# Patient Record
Sex: MALE | Race: WHITE | Employment: UNEMPLOYED | URBAN - METROPOLITAN AREA
[De-identification: names, ages, dates, MRNs, and addresses within clinical notes are randomized per-mention and may not be internally consistent; named-entity substitution may affect disease eponyms.]

---

## 2017-09-14 ENCOUNTER — OFFICE VISIT (OUTPATIENT)
Dept: FAMILY MEDICINE CLINIC | Age: 32
End: 2017-09-14
Payer: MEDICAID

## 2017-09-14 VITALS
OXYGEN SATURATION: 97 % | HEIGHT: 70 IN | HEART RATE: 65 BPM | BODY MASS INDEX: 26.2 KG/M2 | WEIGHT: 183 LBS | SYSTOLIC BLOOD PRESSURE: 128 MMHG | DIASTOLIC BLOOD PRESSURE: 82 MMHG | TEMPERATURE: 97.6 F

## 2017-09-14 DIAGNOSIS — I10 ESSENTIAL HYPERTENSION: ICD-10-CM

## 2017-09-14 DIAGNOSIS — F41.9 ANXIETY: ICD-10-CM

## 2017-09-14 DIAGNOSIS — Z00.00 WELL ADULT EXAM: Primary | ICD-10-CM

## 2017-09-14 DIAGNOSIS — Z23 FLU VACCINE NEED: ICD-10-CM

## 2017-09-14 DIAGNOSIS — M51.36 BULGING LUMBAR DISC: ICD-10-CM

## 2017-09-14 PROCEDURE — 99385 PREV VISIT NEW AGE 18-39: CPT | Performed by: PHYSICIAN ASSISTANT

## 2017-09-14 PROCEDURE — 90472 IMMUNIZATION ADMIN EACH ADD: CPT | Performed by: PHYSICIAN ASSISTANT

## 2017-09-14 PROCEDURE — 90471 IMMUNIZATION ADMIN: CPT | Performed by: PHYSICIAN ASSISTANT

## 2017-09-14 PROCEDURE — 90688 IIV4 VACCINE SPLT 0.5 ML IM: CPT | Performed by: PHYSICIAN ASSISTANT

## 2017-09-14 RX ORDER — ECHINACEA 400 MG
CAPSULE ORAL
COMMUNITY
End: 2018-02-15 | Stop reason: ALTCHOICE

## 2017-09-14 RX ORDER — SERTRALINE HYDROCHLORIDE 100 MG/1
100 TABLET, FILM COATED ORAL DAILY
COMMUNITY
End: 2017-09-14 | Stop reason: SDUPTHER

## 2017-09-14 RX ORDER — SERTRALINE HYDROCHLORIDE 100 MG/1
150 TABLET, FILM COATED ORAL DAILY
Qty: 145 TABLET | Refills: 3 | Status: SHIPPED | OUTPATIENT
Start: 2017-09-14 | End: 2018-02-15 | Stop reason: SDUPTHER

## 2017-09-14 RX ORDER — MELOXICAM 15 MG/1
15 TABLET ORAL DAILY
COMMUNITY
End: 2017-09-14 | Stop reason: SDUPTHER

## 2017-09-14 RX ORDER — LISINOPRIL 10 MG/1
10 TABLET ORAL DAILY
Qty: 90 TABLET | Refills: 3 | Status: SHIPPED | OUTPATIENT
Start: 2017-09-14 | End: 2018-08-03 | Stop reason: SDUPTHER

## 2017-09-14 RX ORDER — LISINOPRIL 10 MG/1
10 TABLET ORAL DAILY
COMMUNITY
End: 2017-09-14 | Stop reason: SDUPTHER

## 2017-09-14 RX ORDER — MELOXICAM 15 MG/1
15 TABLET ORAL DAILY
Qty: 90 TABLET | Refills: 3 | Status: SHIPPED | OUTPATIENT
Start: 2017-09-14 | End: 2018-08-03 | Stop reason: SDUPTHER

## 2017-09-14 ASSESSMENT — ENCOUNTER SYMPTOMS
TROUBLE SWALLOWING: 0
SHORTNESS OF BREATH: 0
CHEST TIGHTNESS: 0
EYE DISCHARGE: 0
COLOR CHANGE: 0
WHEEZING: 0
EYE REDNESS: 0
BLOOD IN STOOL: 0
DIARRHEA: 0
COUGH: 0
CONSTIPATION: 0
VOMITING: 0
ABDOMINAL PAIN: 0
SORE THROAT: 0
NAUSEA: 0
VOICE CHANGE: 0

## 2017-09-19 ENCOUNTER — HOSPITAL ENCOUNTER (OUTPATIENT)
Age: 32
Setting detail: SPECIMEN
Discharge: HOME | End: 2017-09-19
Payer: MEDICAID

## 2017-09-19 DIAGNOSIS — Z00.00 WELL ADULT EXAM: ICD-10-CM

## 2017-09-19 LAB
ABSOLUTE EOS #: 0.1 K/UL (ref 0–0.4)
ABSOLUTE LYMPH #: 2.3 K/UL (ref 1–4.8)
ABSOLUTE MONO #: 0.7 K/UL (ref 0.1–1.2)
ALBUMIN SERPL-MCNC: 4.3 G/DL (ref 3.5–5.2)
ALBUMIN/GLOBULIN RATIO: 1.6 (ref 1–2.5)
ALP BLD-CCNC: 53 U/L (ref 40–129)
ALT SERPL-CCNC: 21 U/L (ref 5–41)
ANION GAP SERPL CALCULATED.3IONS-SCNC: 15 MMOL/L (ref 9–17)
AST SERPL-CCNC: 24 U/L
BASOPHILS # BLD: 1 %
BASOPHILS ABSOLUTE: 0 K/UL (ref 0–0.2)
BILIRUB SERPL-MCNC: 0.67 MG/DL (ref 0.3–1.2)
BUN BLDV-MCNC: 16 MG/DL (ref 6–20)
BUN/CREAT BLD: NORMAL (ref 9–20)
CALCIUM SERPL-MCNC: 9.5 MG/DL (ref 8.6–10.4)
CHLORIDE BLD-SCNC: 102 MMOL/L (ref 98–107)
CHOLESTEROL/HDL RATIO: 3.9
CHOLESTEROL: 177 MG/DL
CO2: 23 MMOL/L (ref 20–31)
CREAT SERPL-MCNC: 0.98 MG/DL (ref 0.7–1.2)
DIFFERENTIAL TYPE: NORMAL
EOSINOPHILS RELATIVE PERCENT: 1 %
GFR AFRICAN AMERICAN: >60 ML/MIN
GFR NON-AFRICAN AMERICAN: >60 ML/MIN
GFR SERPL CREATININE-BSD FRML MDRD: NORMAL ML/MIN/{1.73_M2}
GFR SERPL CREATININE-BSD FRML MDRD: NORMAL ML/MIN/{1.73_M2}
GLUCOSE BLD-MCNC: 92 MG/DL (ref 70–99)
HCT VFR BLD CALC: 43.5 % (ref 41–53)
HDLC SERPL-MCNC: 45 MG/DL
HEMOGLOBIN: 14.9 G/DL (ref 13.5–17.5)
LDL CHOLESTEROL: 114 MG/DL (ref 0–130)
LYMPHOCYTES # BLD: 37 %
MCH RBC QN AUTO: 30.6 PG (ref 26–34)
MCHC RBC AUTO-ENTMCNC: 34.2 G/DL (ref 31–37)
MCV RBC AUTO: 89.5 FL (ref 80–100)
MONOCYTES # BLD: 11 %
PDW BLD-RTO: 12.8 % (ref 12.5–15.4)
PLATELET # BLD: 202 K/UL (ref 140–450)
PLATELET ESTIMATE: NORMAL
PMV BLD AUTO: 8.5 FL (ref 6–12)
POTASSIUM SERPL-SCNC: 4.8 MMOL/L (ref 3.7–5.3)
RBC # BLD: 4.87 M/UL (ref 4.5–5.9)
RBC # BLD: NORMAL 10*6/UL
SEG NEUTROPHILS: 50 %
SEGMENTED NEUTROPHILS ABSOLUTE COUNT: 3.2 K/UL (ref 1.8–7.7)
SODIUM BLD-SCNC: 140 MMOL/L (ref 135–144)
TOTAL PROTEIN: 7 G/DL (ref 6.4–8.3)
TRIGL SERPL-MCNC: 88 MG/DL
TSH SERPL DL<=0.05 MIU/L-ACNC: 1.84 MIU/L (ref 0.3–5)
VLDLC SERPL CALC-MCNC: NORMAL MG/DL (ref 1–30)
WBC # BLD: 6.2 K/UL (ref 3.5–11)
WBC # BLD: NORMAL 10*3/UL

## 2017-10-16 ENCOUNTER — OFFICE VISIT (OUTPATIENT)
Dept: FAMILY MEDICINE CLINIC | Age: 32
End: 2017-10-16
Payer: MEDICAID

## 2017-10-16 VITALS
HEART RATE: 74 BPM | DIASTOLIC BLOOD PRESSURE: 70 MMHG | OXYGEN SATURATION: 97 % | SYSTOLIC BLOOD PRESSURE: 124 MMHG | WEIGHT: 186 LBS | TEMPERATURE: 98.4 F | HEIGHT: 70 IN | BODY MASS INDEX: 26.63 KG/M2

## 2017-10-16 DIAGNOSIS — F41.9 ANXIETY: Primary | ICD-10-CM

## 2017-10-16 PROCEDURE — 99213 OFFICE O/P EST LOW 20 MIN: CPT | Performed by: PHYSICIAN ASSISTANT

## 2017-10-16 ASSESSMENT — ENCOUNTER SYMPTOMS
COLOR CHANGE: 0
WHEEZING: 0
SHORTNESS OF BREATH: 0
COUGH: 0

## 2017-10-16 NOTE — PROGRESS NOTES
[address] [address]  Dept: [phone]  Dept Fax: [phone]    Ally Navarro is a 27 y.o. male who presents today for his medical conditions/complaints as noted below. Ally Navarro is c/o of   Chief Complaint   Patient presents with    Anxiety     follow up on zoloft/ 1 month follow up. HPI:     HPI    Patient here for follow up on the zoloft use. He states the current dose is going well  He states he is feeling well on the present dose. 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 He states he has paperwork that needs reviewed for evaluation regarding his hx of back troubles that previously were worked up by the [place].        No results found for: LABA1C          ( goal A1C is < 7)   No results found for: LABMICR  LDL Cholesterol (mg/dL)   Date Value   09/19/2017 114       (goal LDL is <100)   AST (U/L)   Date Value   09/19/2017 24     ALT (U/L)   Date Value   09/19/2017 21     BUN (mg/dL)   Date Value   09/19/2017 16     BP Readings from Last 3 Encounters:   10/16/17 124/70   09/14/17 128/82          (goal 120/80)    Past Medical History:   Diagnosis Date    Anxiety     Bulging lumbar disc     Chronic dryness of both eyes     Hypertension       Past Surgical History:   Procedure Laterality Date    EYE SURGERY      SEPTOPLASTY      TONSILLECTOMY         Family History   Problem Relation Age of Onset    Diabetes Maternal Grandfather     Other Mother      cholelithiasis    Other Maternal Grandmother      alzeimers    Cancer Paternal Grandmother      unknown       Social History   Substance Use Topics    Smoking status: Never Smoker    Smokeless tobacco: Never Used    Alcohol use No      Current Outpatient Prescriptions   Medication Sig Dispense Refill    Multiple Vitamins-Minerals (MENS MULTI VITAMIN & MINERAL PO) Take by mouth      Flaxseed, Linseed, (FLAXSEED OIL) 1000 MG CAPS Take by mouth      meloxicam (MOBIC) 15 MG tablet Take 1 tablet by mouth daily 90 tablet 3    lisinopril (PRINIVIL;ZESTRIL) 10 MG tablet Take 1 tablet by mouth daily 90 tablet 3    sertraline (ZOLOFT) 100 MG tablet Take 1.5 tablets by mouth daily 145 tablet 3     No current facility-administered medications for this visit. No Known Allergies    Health Maintenance   Topic Date Due    HIV screen  05/15/1998    DTaP/Tdap/Td vaccine (1 - Tdap) 05/15/2002    Flu vaccine  Completed       Subjective:      Review of Systems   Constitutional: Negative for activity change, appetite change, fatigue, fever and unexpected weight change. /70 (Site: Left Arm, Position: Sitting, Cuff Size: Medium Adult)   Pulse 74   Temp 98.4 °F (36.9 °C) (Tympanic)   Ht 5' 10\" (1.778 m)   Wt 186 lb (84.4 kg)   SpO2 97%   BMI 26.69 kg/m²    Respiratory: Negative for cough, shortness of breath and wheezing. Cardiovascular: Negative for chest pain and palpitations. Skin: Negative for color change, pallor, rash and wound. Neurological: Negative for weakness. Hematological: Negative for adenopathy. Psychiatric/Behavioral: Negative for agitation, confusion, sleep disturbance and suicidal ideas. The patient is not nervous/anxious. Objective:     Physical Exam   Constitutional: He is oriented to person, place, and time. He appears well-developed and well-nourished. HENT:   Head: Normocephalic and atraumatic. Cardiovascular: Normal rate, regular rhythm and normal heart sounds. No murmur heard. Pulmonary/Chest: Effort normal and breath sounds normal. No respiratory distress. He has no wheezes. He has no rales. Neurological: He is alert and oriented to person, place, and time. Skin: Skin is warm and dry. No rash noted. No erythema. No pallor. Psychiatric: He has a normal mood and affect. His behavior is normal. Judgment and thought content normal.   Nursing note and vitals reviewed.     BP 124/70 (Site: Left Arm, Position: Sitting, Cuff Size: Medium Adult)   Pulse 74   Temp 98.4 °F (36.9 °C) (Tympanic)   Ht 5' 10\" (1.778 m)   Wt 186 lb (84.4 kg)   SpO2 97%   BMI 26.69 kg/m²     Assessment:      1. Anxiety               Plan:      Return in about 4 months (around 2/16/2018) for med review. Patient doing well on present dose of zoloft  Cont present treatment plan  Last labs reviewed and stable  Patient will try and get records for my review, awaiting prior [facility] work up  Patient agreed with Jojo Alcant[facility] [address]. Maintenance reviewed. Patient given educational materials - see patient instructions. Discussed use, benefit, and side effects of prescribed medications. All patient questions answered. Pt voiced understanding. Reviewed health maintenance. Instructed to continue current medications, diet and exercise. Patient agreed with treatment plan. Follow up as directed.      Electronically signed by Sal Pimentel PA-C on 10/16/2017 at 12:49 PM

## 2018-02-15 ENCOUNTER — OFFICE VISIT (OUTPATIENT)
Dept: FAMILY MEDICINE CLINIC | Age: 33
End: 2018-02-15
Payer: COMMERCIAL

## 2018-02-15 VITALS
HEART RATE: 52 BPM | DIASTOLIC BLOOD PRESSURE: 74 MMHG | OXYGEN SATURATION: 97 % | BODY MASS INDEX: 25.91 KG/M2 | TEMPERATURE: 98.2 F | HEIGHT: 70 IN | SYSTOLIC BLOOD PRESSURE: 132 MMHG | WEIGHT: 181 LBS

## 2018-02-15 DIAGNOSIS — F41.9 ANXIETY: ICD-10-CM

## 2018-02-15 DIAGNOSIS — I10 ESSENTIAL HYPERTENSION: Primary | ICD-10-CM

## 2018-02-15 DIAGNOSIS — M51.36 BULGING LUMBAR DISC: ICD-10-CM

## 2018-02-15 PROCEDURE — 99213 OFFICE O/P EST LOW 20 MIN: CPT | Performed by: PHYSICIAN ASSISTANT

## 2018-02-15 RX ORDER — SERTRALINE HYDROCHLORIDE 100 MG/1
150 TABLET, FILM COATED ORAL DAILY
Qty: 145 TABLET | Refills: 3 | Status: SHIPPED | OUTPATIENT
Start: 2018-02-15 | End: 2018-08-03 | Stop reason: SDUPTHER

## 2018-02-15 RX ORDER — CHLORAL HYDRATE 500 MG
3000 CAPSULE ORAL 3 TIMES DAILY
COMMUNITY

## 2018-02-15 ASSESSMENT — PATIENT HEALTH QUESTIONNAIRE - PHQ9
SUM OF ALL RESPONSES TO PHQ QUESTIONS 1-9: 0
1. LITTLE INTEREST OR PLEASURE IN DOING THINGS: 0
SUM OF ALL RESPONSES TO PHQ9 QUESTIONS 1 & 2: 0
2. FEELING DOWN, DEPRESSED OR HOPELESS: 0

## 2018-02-15 ASSESSMENT — ENCOUNTER SYMPTOMS
COUGH: 0
NAUSEA: 0
VOMITING: 0
CONSTIPATION: 0
COLOR CHANGE: 0
SHORTNESS OF BREATH: 0
BLURRED VISION: 0
DIARRHEA: 0
WHEEZING: 0
ABDOMINAL PAIN: 0

## 2018-02-15 NOTE — PROGRESS NOTES
Surgical History:   Procedure Laterality Date    EYE SURGERY      SEPTOPLASTY      TONSILLECTOMY         Family History   Problem Relation Age of Onset    Diabetes Maternal Grandfather     Other Mother      cholelithiasis    Other Maternal Grandmother      alzeimers    Cancer Paternal Grandmother      unknown       Social History   Substance Use Topics    Smoking status: Never Smoker    Smokeless tobacco: Never Used    Alcohol use No      Current Outpatient Prescriptions   Medication Sig Dispense Refill    Omega-3 Fatty Acids (FISH OIL) 1000 MG CAPS Take 3,000 mg by mouth 3 times daily      sertraline (ZOLOFT) 100 MG tablet Take 1.5 tablets by mouth daily 145 tablet 3    Multiple Vitamins-Minerals (MENS MULTI VITAMIN & MINERAL PO) Take by mouth      meloxicam (MOBIC) 15 MG tablet Take 1 tablet by mouth daily 90 tablet 3    lisinopril (PRINIVIL;ZESTRIL) 10 MG tablet Take 1 tablet by mouth daily 90 tablet 3     No current facility-administered medications for this visit. No Known Allergies    Health Maintenance   Topic Date Due    HIV screen  05/15/1998    DTaP/Tdap/Td vaccine (1 - Tdap) 05/15/2002    Potassium monitoring  09/19/2018    Creatinine monitoring  09/19/2018    Flu vaccine  Completed       Subjective:      Review of Systems   Constitutional: Negative for activity change, appetite change, fatigue, fever and unexpected weight change. /74   Pulse 52   Temp 98.2 °F (36.8 °C) (Oral)   Ht 5' 10\" (1.778 m)   Wt 181 lb (82.1 kg)   SpO2 97%   BMI 25.97 kg/m²    Eyes: Negative for blurred vision. Respiratory: Negative for cough, shortness of breath and wheezing. Cardiovascular: Negative for chest pain and palpitations. Gastrointestinal: Negative for abdominal pain, constipation, diarrhea, nausea and vomiting. Skin: Negative for color change, pallor, rash and wound. Neurological: Negative for weakness and headaches. Hematological: Negative for adenopathy. diet and exercise. Patient agreed with treatment plan. Follow up as directed.      Electronically signed by Aashish Doe PA-C on 2/15/2018 at 8:24 AM

## 2018-08-03 ENCOUNTER — OFFICE VISIT (OUTPATIENT)
Dept: FAMILY MEDICINE CLINIC | Age: 33
End: 2018-08-03
Payer: COMMERCIAL

## 2018-08-03 VITALS
HEIGHT: 70 IN | RESPIRATION RATE: 18 BRPM | DIASTOLIC BLOOD PRESSURE: 82 MMHG | TEMPERATURE: 97.3 F | OXYGEN SATURATION: 96 % | WEIGHT: 200 LBS | SYSTOLIC BLOOD PRESSURE: 130 MMHG | BODY MASS INDEX: 28.63 KG/M2 | HEART RATE: 80 BPM

## 2018-08-03 DIAGNOSIS — F41.9 ANXIETY: ICD-10-CM

## 2018-08-03 DIAGNOSIS — I10 ESSENTIAL HYPERTENSION: ICD-10-CM

## 2018-08-03 DIAGNOSIS — M51.36 BULGING LUMBAR DISC: ICD-10-CM

## 2018-08-03 PROCEDURE — 99213 OFFICE O/P EST LOW 20 MIN: CPT | Performed by: INTERNAL MEDICINE

## 2018-08-03 RX ORDER — SERTRALINE HYDROCHLORIDE 100 MG/1
150 TABLET, FILM COATED ORAL DAILY
Qty: 145 TABLET | Refills: 3 | Status: SHIPPED | OUTPATIENT
Start: 2018-08-03 | End: 2019-02-08 | Stop reason: SDUPTHER

## 2018-08-03 RX ORDER — MELOXICAM 15 MG/1
15 TABLET ORAL DAILY
Qty: 90 TABLET | Refills: 3 | Status: SHIPPED | OUTPATIENT
Start: 2018-08-03 | End: 2019-02-08 | Stop reason: SDUPTHER

## 2018-08-03 RX ORDER — LISINOPRIL 10 MG/1
10 TABLET ORAL DAILY
Qty: 90 TABLET | Refills: 3 | Status: SHIPPED | OUTPATIENT
Start: 2018-08-03 | End: 2019-02-08 | Stop reason: SDUPTHER

## 2018-08-03 ASSESSMENT — ENCOUNTER SYMPTOMS
DIARRHEA: 0
CONSTIPATION: 0
BLOOD IN STOOL: 0
SHORTNESS OF BREATH: 0
COUGH: 0
ORTHOPNEA: 0
BLURRED VISION: 0
WHEEZING: 0
CHOKING: 0
CHEST TIGHTNESS: 0
ABDOMINAL PAIN: 0
BACK PAIN: 0

## 2018-08-03 NOTE — PROGRESS NOTES
[address] [facility] [address]  Dept: [phone]  Dept Fax: [phone]    Gurvinder Sharma is a 26 y.o. male who presents today for his medical conditions/complaints as noted below. Gurvinder Sharma is c/o of   Chief Complaint   Patient presents with    Hypertension     Med check         HPI:     Here for routine med check   Was supposed to see Mark Wu   Is on zoloft for anxiety and lisinopril for hypertension   States he is still doing very well on both these medications   Is very physically active  No cardiac sxs   No side effects from medication   Checks bp about once a week and is typically 120/80  Had labs 9/2017      Hypertension   This is a chronic problem. The current episode started more than 1 year ago. The problem is unchanged. The problem is controlled. Associated symptoms include anxiety. Pertinent negatives include no blurred vision, chest pain, headaches, malaise/fatigue, neck pain, orthopnea, palpitations, peripheral edema, PND, shortness of breath or sweats. There are no associated agents to hypertension. Risk factors for coronary artery disease include family history, male gender and stress. Past treatments include ACE inhibitors. The current treatment provides moderate improvement. There are no compliance problems. There is no history of angina, kidney disease, CAD/MI, CVA, heart failure or left ventricular hypertrophy. Identifiable causes of hypertension include a hypertension causing med. There is no history of chronic renal disease, coarctation of the aorta, hyperaldosteronism, pheochromocytoma, renovascular disease or a thyroid problem.        No results found for: LABA1C          ( goal A1C is < 7)   No results found for: LABMICR  LDL Cholesterol (mg/dL)   Date Value   09/19/2017 114       (goal LDL is <100)   AST (U/L)   Date Value   09/19/2017 24     ALT (U/L)   Date Value   09/19/2017 21     BUN (mg/dL) Date Value   09/19/2017 16     BP Readings from Last 3 Encounters:   08/03/18 130/82   02/15/18 132/74   10/16/17 124/70          (goal 120/80)    Past Medical History:   Diagnosis Date    Anxiety     Bulging lumbar disc     Chronic dryness of both eyes     Hypertension       Past Surgical History:   Procedure Laterality Date    EYE SURGERY      SEPTOPLASTY      TONSILLECTOMY         Family History   Problem Relation Age of Onset    Diabetes Maternal Grandfather     Other Mother         cholelithiasis    Other Maternal Grandmother         alzeimers    Cancer Paternal Grandmother         unknown       Social History   Substance Use Topics    Smoking status: Never Smoker    Smokeless tobacco: Never Used    Alcohol use No      Current Outpatient Prescriptions   Medication Sig Dispense Refill    lisinopril (PRINIVIL;ZESTRIL) 10 MG tablet Take 1 tablet by mouth daily 90 tablet 3    meloxicam (MOBIC) 15 MG tablet Take 1 tablet by mouth daily 90 tablet 3    sertraline (ZOLOFT) 100 MG tablet Take 1.5 tablets by mouth daily 145 tablet 3    Omega-3 Fatty Acids (FISH OIL) 1000 MG CAPS Take 3,000 mg by mouth 3 times daily      Multiple Vitamins-Minerals (MENS MULTI VITAMIN & MINERAL PO) Take by mouth       No current facility-administered medications for this visit. No Known Allergies    Health Maintenance   Topic Date Due    HIV screen  05/15/1998    DTaP/Tdap/Td vaccine (1 - Tdap) 05/15/2002    Flu vaccine (1) 09/01/2018    Potassium monitoring  09/19/2018    Creatinine monitoring  09/19/2018       Subjective:      Review of Systems   Constitutional: Negative for activity change, appetite change, chills, diaphoresis, fatigue, fever, malaise/fatigue and unexpected weight change. Eyes: Negative for blurred vision and visual disturbance. Respiratory: Negative for cough, choking, chest tightness, shortness of breath and wheezing.     Cardiovascular: Negative for chest pain, palpitations, orthopnea, leg swelling and PND. Gastrointestinal: Negative for abdominal pain, blood in stool, constipation and diarrhea. Genitourinary: Negative for difficulty urinating. Musculoskeletal: Negative for arthralgias, back pain and neck pain. Skin: Negative for rash. Neurological: Negative for dizziness, syncope, weakness, light-headedness and headaches. Hematological: Negative for adenopathy. Does not bruise/bleed easily. Psychiatric/Behavioral: Negative for decreased concentration and sleep disturbance. The patient is nervous/anxious. Objective:     Physical Exam   Constitutional: He is oriented to person, place, and time. He appears well-developed and well-nourished. No distress. HENT:   Right Ear: External ear normal.   Left Ear: External ear normal.   Nose: Nose normal.   Eyes: EOM are normal. Pupils are equal, round, and reactive to light. Neck: Normal range of motion. Neck supple. No JVD present. No thyromegaly present. Cardiovascular: Normal rate, regular rhythm, S1 normal, S2 normal and normal heart sounds. No extrasystoles are present. Exam reveals no gallop. No murmur heard. Pulmonary/Chest: Effort normal and breath sounds normal. No respiratory distress. He has no wheezes. Abdominal: Soft. Bowel sounds are normal. He exhibits no distension. There is no splenomegaly or hepatomegaly. There is no tenderness. Lymphadenopathy:     He has no cervical adenopathy. Neurological: He is alert and oriented to person, place, and time. Skin: Skin is warm and dry. No rash noted. He is not diaphoretic. Psychiatric: He has a normal mood and affect. Nursing note and vitals reviewed. /82 (Site: Left Arm, Position: Sitting, Cuff Size: Large Adult)   Pulse 80   Temp 97.3 °F (36.3 °C) (Tympanic)   Resp 18   Ht 5' 10\" (1.778 m)   Wt 200 lb (90.7 kg)   SpO2 96%   BMI 28.70 kg/m²     Assessment:       Diagnosis Orders   1.  Essential hypertension  lisinopril

## 2018-08-03 NOTE — PROGRESS NOTES
Visit Information    Have you changed or started any medications since your last visit including any over-the-counter medicines, vitamins, or herbal medicines? no   Are you having any side effects from any of your medications? -  no  Have you stopped taking any of your medications? Is so, why? -  no    Have you seen any other physician or provider since your last visit? No  Have you had any other diagnostic tests since your last visit? No  Have you been seen in the emergency room and/or had an admission to a hospital since we last saw you? No  Have you had your routine dental cleaning in the past 6 months? no    Have you activated your [employer] account? If not, what are your barriers?  Yes     Patient Care Team:  Marvel Kawasaki, PA-C as PCP - General (Family Medicine)    Medical History Review  Past Medical, Family, and Social History reviewed and does not contribute to the patient presenting condition    Health Maintenance   Topic Date Due    HIV screen  05/15/1998    DTaP/Tdap/Td vaccine (1 - Tdap) 05/15/2002    Flu vaccine (1) 09/01/2018    Potassium monitoring  09/19/2018    Creatinine monitoring  09/19/2018

## 2019-02-08 ENCOUNTER — HOSPITAL ENCOUNTER (OUTPATIENT)
Age: 34
Setting detail: SPECIMEN
Discharge: HOME | End: 2019-02-08
Payer: COMMERCIAL

## 2019-02-08 ENCOUNTER — OFFICE VISIT (OUTPATIENT)
Dept: FAMILY MEDICINE CLINIC | Age: 34
End: 2019-02-08
Payer: COMMERCIAL

## 2019-02-08 VITALS
WEIGHT: 199 LBS | DIASTOLIC BLOOD PRESSURE: 80 MMHG | SYSTOLIC BLOOD PRESSURE: 130 MMHG | HEART RATE: 81 BPM | HEIGHT: 70 IN | OXYGEN SATURATION: 99 % | BODY MASS INDEX: 28.49 KG/M2

## 2019-02-08 DIAGNOSIS — M51.36 BULGING LUMBAR DISC: ICD-10-CM

## 2019-02-08 DIAGNOSIS — Z13.220 ENCOUNTER FOR SCREENING FOR LIPID DISORDER: ICD-10-CM

## 2019-02-08 DIAGNOSIS — I10 ESSENTIAL HYPERTENSION: ICD-10-CM

## 2019-02-08 DIAGNOSIS — F41.9 ANXIETY: ICD-10-CM

## 2019-02-08 DIAGNOSIS — I10 ESSENTIAL HYPERTENSION: Primary | ICD-10-CM

## 2019-02-08 LAB
ABSOLUTE EOS #: 0.15 K/UL (ref 0–0.44)
ABSOLUTE IMMATURE GRANULOCYTE: <0.03 K/UL (ref 0–0.3)
ABSOLUTE LYMPH #: 2.28 K/UL (ref 1.1–3.7)
ABSOLUTE MONO #: 0.52 K/UL (ref 0.1–1.2)
ALBUMIN SERPL-MCNC: 4.7 G/DL (ref 3.5–5.2)
ALBUMIN/GLOBULIN RATIO: 1.7 (ref 1–2.5)
ALP BLD-CCNC: 62 U/L (ref 40–129)
ALT SERPL-CCNC: 24 U/L (ref 5–41)
ANION GAP SERPL CALCULATED.3IONS-SCNC: 10 MMOL/L (ref 9–17)
AST SERPL-CCNC: 25 U/L
BASOPHILS # BLD: 1 % (ref 0–2)
BASOPHILS ABSOLUTE: 0.04 K/UL (ref 0–0.2)
BILIRUB SERPL-MCNC: 0.7 MG/DL (ref 0.3–1.2)
BUN BLDV-MCNC: 22 MG/DL (ref 6–20)
BUN/CREAT BLD: ABNORMAL (ref 9–20)
CALCIUM SERPL-MCNC: 9.9 MG/DL (ref 8.6–10.4)
CHLORIDE BLD-SCNC: 104 MMOL/L (ref 98–107)
CHOLESTEROL/HDL RATIO: 5.7
CHOLESTEROL: 216 MG/DL
CO2: 25 MMOL/L (ref 20–31)
CREAT SERPL-MCNC: 1.13 MG/DL (ref 0.7–1.2)
DIFFERENTIAL TYPE: NORMAL
EOSINOPHILS RELATIVE PERCENT: 3 % (ref 1–4)
GFR AFRICAN AMERICAN: >60 ML/MIN
GFR NON-AFRICAN AMERICAN: >60 ML/MIN
GFR SERPL CREATININE-BSD FRML MDRD: ABNORMAL ML/MIN/{1.73_M2}
GFR SERPL CREATININE-BSD FRML MDRD: ABNORMAL ML/MIN/{1.73_M2}
GLUCOSE BLD-MCNC: 83 MG/DL (ref 70–99)
HCT VFR BLD CALC: 47.6 % (ref 40.7–50.3)
HDLC SERPL-MCNC: 38 MG/DL
HEMOGLOBIN: 16 G/DL (ref 13–17)
IMMATURE GRANULOCYTES: 0 %
LDL CHOLESTEROL: 139 MG/DL (ref 0–130)
LYMPHOCYTES # BLD: 38 % (ref 24–43)
MCH RBC QN AUTO: 30.5 PG (ref 25.2–33.5)
MCHC RBC AUTO-ENTMCNC: 33.6 G/DL (ref 28.4–34.8)
MCV RBC AUTO: 90.8 FL (ref 82.6–102.9)
MONOCYTES # BLD: 9 % (ref 3–12)
NRBC AUTOMATED: 0 PER 100 WBC
PDW BLD-RTO: 11.9 % (ref 11.8–14.4)
PLATELET # BLD: 248 K/UL (ref 138–453)
PLATELET ESTIMATE: NORMAL
PMV BLD AUTO: 10.6 FL (ref 8.1–13.5)
POTASSIUM SERPL-SCNC: 4.6 MMOL/L (ref 3.7–5.3)
RBC # BLD: 5.24 M/UL (ref 4.21–5.77)
RBC # BLD: NORMAL 10*6/UL
SEG NEUTROPHILS: 49 % (ref 36–65)
SEGMENTED NEUTROPHILS ABSOLUTE COUNT: 3 K/UL (ref 1.5–8.1)
SODIUM BLD-SCNC: 139 MMOL/L (ref 135–144)
TOTAL PROTEIN: 7.4 G/DL (ref 6.4–8.3)
TRIGL SERPL-MCNC: 197 MG/DL
VLDLC SERPL CALC-MCNC: ABNORMAL MG/DL (ref 1–30)
WBC # BLD: 6 K/UL (ref 3.5–11.3)
WBC # BLD: NORMAL 10*3/UL

## 2019-02-08 PROCEDURE — 99213 OFFICE O/P EST LOW 20 MIN: CPT | Performed by: PHYSICIAN ASSISTANT

## 2019-02-08 RX ORDER — SERTRALINE HYDROCHLORIDE 100 MG/1
150 TABLET, FILM COATED ORAL DAILY
Qty: 135 TABLET | Refills: 1 | Status: SHIPPED | OUTPATIENT
Start: 2019-02-08 | End: 2019-08-09 | Stop reason: SDUPTHER

## 2019-02-08 RX ORDER — LISINOPRIL 10 MG/1
10 TABLET ORAL DAILY
Qty: 90 TABLET | Refills: 3 | Status: SHIPPED | OUTPATIENT
Start: 2019-02-08 | End: 2020-03-02

## 2019-02-08 RX ORDER — MELOXICAM 15 MG/1
15 TABLET ORAL DAILY
Qty: 90 TABLET | Refills: 3 | Status: SHIPPED | OUTPATIENT
Start: 2019-02-08 | End: 2020-03-02

## 2019-02-08 ASSESSMENT — ENCOUNTER SYMPTOMS
COUGH: 0
SHORTNESS OF BREATH: 0
CONSTIPATION: 0
DIARRHEA: 0
ABDOMINAL PAIN: 0
COLOR CHANGE: 0
WHEEZING: 0
VOMITING: 0
BLURRED VISION: 0
NAUSEA: 0

## 2019-02-12 ENCOUNTER — PATIENT MESSAGE (OUTPATIENT)
Dept: FAMILY MEDICINE CLINIC | Age: 34
End: 2019-02-12

## 2019-08-09 DIAGNOSIS — F41.9 ANXIETY: ICD-10-CM

## 2019-08-09 RX ORDER — SERTRALINE HYDROCHLORIDE 100 MG/1
TABLET, FILM COATED ORAL
Qty: 135 TABLET | Refills: 1 | Status: SHIPPED | OUTPATIENT
Start: 2019-08-09 | End: 2020-03-02

## 2019-09-13 ENCOUNTER — OFFICE VISIT (OUTPATIENT)
Dept: FAMILY MEDICINE CLINIC | Age: 34
End: 2019-09-13
Payer: COMMERCIAL

## 2019-09-13 ENCOUNTER — HOSPITAL ENCOUNTER (OUTPATIENT)
Age: 34
Setting detail: SPECIMEN
Discharge: HOME | End: 2019-09-13
Payer: COMMERCIAL

## 2019-09-13 VITALS
TEMPERATURE: 98.6 F | OXYGEN SATURATION: 99 % | WEIGHT: 206 LBS | SYSTOLIC BLOOD PRESSURE: 132 MMHG | HEART RATE: 82 BPM | BODY MASS INDEX: 29.49 KG/M2 | DIASTOLIC BLOOD PRESSURE: 80 MMHG | HEIGHT: 70 IN

## 2019-09-13 DIAGNOSIS — Z23 NEED FOR INFLUENZA VACCINATION: ICD-10-CM

## 2019-09-13 DIAGNOSIS — Z13.220 ENCOUNTER FOR SCREENING FOR LIPID DISORDER: ICD-10-CM

## 2019-09-13 DIAGNOSIS — I10 ESSENTIAL HYPERTENSION: Primary | ICD-10-CM

## 2019-09-13 DIAGNOSIS — I10 ESSENTIAL HYPERTENSION: ICD-10-CM

## 2019-09-13 LAB
ABSOLUTE EOS #: 0.15 K/UL (ref 0–0.44)
ABSOLUTE IMMATURE GRANULOCYTE: 0.05 K/UL (ref 0–0.3)
ABSOLUTE LYMPH #: 2.33 K/UL (ref 1.1–3.7)
ABSOLUTE MONO #: 0.64 K/UL (ref 0.1–1.2)
ALBUMIN SERPL-MCNC: 4.5 G/DL (ref 3.5–5.2)
ALBUMIN/GLOBULIN RATIO: 1.5 (ref 1–2.5)
ALP BLD-CCNC: 55 U/L (ref 40–129)
ALT SERPL-CCNC: 24 U/L (ref 5–41)
ANION GAP SERPL CALCULATED.3IONS-SCNC: 12 MMOL/L (ref 9–17)
AST SERPL-CCNC: 25 U/L
BASOPHILS # BLD: 1 % (ref 0–2)
BASOPHILS ABSOLUTE: 0.04 K/UL (ref 0–0.2)
BILIRUB SERPL-MCNC: 0.57 MG/DL (ref 0.3–1.2)
BUN BLDV-MCNC: 24 MG/DL (ref 6–20)
BUN/CREAT BLD: ABNORMAL (ref 9–20)
CALCIUM SERPL-MCNC: 9.5 MG/DL (ref 8.6–10.4)
CHLORIDE BLD-SCNC: 105 MMOL/L (ref 98–107)
CHOLESTEROL/HDL RATIO: 5.2
CHOLESTEROL: 206 MG/DL
CO2: 24 MMOL/L (ref 20–31)
CREAT SERPL-MCNC: 1 MG/DL (ref 0.7–1.2)
DIFFERENTIAL TYPE: ABNORMAL
EOSINOPHILS RELATIVE PERCENT: 2 % (ref 1–4)
GFR AFRICAN AMERICAN: >60 ML/MIN
GFR NON-AFRICAN AMERICAN: >60 ML/MIN
GFR SERPL CREATININE-BSD FRML MDRD: ABNORMAL ML/MIN/{1.73_M2}
GFR SERPL CREATININE-BSD FRML MDRD: ABNORMAL ML/MIN/{1.73_M2}
GLUCOSE BLD-MCNC: 86 MG/DL (ref 70–99)
HCT VFR BLD CALC: 47.2 % (ref 40.7–50.3)
HDLC SERPL-MCNC: 40 MG/DL
HEMOGLOBIN: 15.4 G/DL (ref 13–17)
IMMATURE GRANULOCYTES: 1 %
LDL CHOLESTEROL: 127 MG/DL (ref 0–130)
LYMPHOCYTES # BLD: 33 % (ref 24–43)
MCH RBC QN AUTO: 29.7 PG (ref 25.2–33.5)
MCHC RBC AUTO-ENTMCNC: 32.6 G/DL (ref 28.4–34.8)
MCV RBC AUTO: 91.1 FL (ref 82.6–102.9)
MONOCYTES # BLD: 9 % (ref 3–12)
NRBC AUTOMATED: 0 PER 100 WBC
PDW BLD-RTO: 12.3 % (ref 11.8–14.4)
PLATELET # BLD: 202 K/UL (ref 138–453)
PLATELET ESTIMATE: ABNORMAL
PMV BLD AUTO: 11 FL (ref 8.1–13.5)
POTASSIUM SERPL-SCNC: 4.1 MMOL/L (ref 3.7–5.3)
RBC # BLD: 5.18 M/UL (ref 4.21–5.77)
RBC # BLD: ABNORMAL 10*6/UL
SEG NEUTROPHILS: 54 % (ref 36–65)
SEGMENTED NEUTROPHILS ABSOLUTE COUNT: 3.76 K/UL (ref 1.5–8.1)
SODIUM BLD-SCNC: 141 MMOL/L (ref 135–144)
TOTAL PROTEIN: 7.5 G/DL (ref 6.4–8.3)
TRIGL SERPL-MCNC: 194 MG/DL
VLDLC SERPL CALC-MCNC: ABNORMAL MG/DL (ref 1–30)
WBC # BLD: 7 K/UL (ref 3.5–11.3)
WBC # BLD: ABNORMAL 10*3/UL

## 2019-09-13 PROCEDURE — 90471 IMMUNIZATION ADMIN: CPT | Performed by: PHYSICIAN ASSISTANT

## 2019-09-13 PROCEDURE — 90686 IIV4 VACC NO PRSV 0.5 ML IM: CPT | Performed by: PHYSICIAN ASSISTANT

## 2019-09-13 PROCEDURE — 99213 OFFICE O/P EST LOW 20 MIN: CPT | Performed by: PHYSICIAN ASSISTANT

## 2019-09-13 ASSESSMENT — ENCOUNTER SYMPTOMS
SHORTNESS OF BREATH: 0
DIARRHEA: 0
VOMITING: 0
BLURRED VISION: 0
CONSTIPATION: 0
NAUSEA: 0
WHEEZING: 0
COUGH: 0
COLOR CHANGE: 0
ABDOMINAL PAIN: 0

## 2019-09-13 NOTE — PROGRESS NOTES
 Other Maternal Grandmother         alzeimers    Cancer Paternal Grandmother         unknown    Other Paternal Aunt          atrial fib          Social History     Tobacco Use    Smoking status: Never Smoker    Smokeless tobacco: Never Used   Substance Use Topics    Alcohol use: No         Current Outpatient Medications   Medication Sig Dispense Refill    sertraline (ZOLOFT) 100 MG tablet TAKE ONE AND ONE-HALF TABLETS DAILY 135 tablet 1    meloxicam (MOBIC) 15 MG tablet Take 1 tablet by mouth daily 90 tablet 3    lisinopril (PRINIVIL;ZESTRIL) 10 MG tablet Take 1 tablet by mouth daily 90 tablet 3    Omega-3 Fatty Acids (FISH OIL) 1000 MG CAPS Take 3,000 mg by mouth 3 times daily      Multiple Vitamins-Minerals (MENS MULTI VITAMIN & MINERAL PO) Take by mouth       No current facility-administered medications for this visit. No Known Allergies    Health Maintenance   Topic Date Due    Varicella Vaccine (1 of 2 - 13+ 2-dose series) 05/15/1996    HIV screen  05/15/1998    Flu vaccine (1) 09/01/2019    Potassium monitoring  02/08/2020    Creatinine monitoring  02/08/2020    DTaP/Tdap/Td vaccine (2 - Td) 10/28/2028    Pneumococcal 0-64 years Vaccine  Aged Out       Subjective:     Review of Systems   Constitutional: Negative for activity change, appetite change, fatigue, fever, malaise/fatigue and unexpected weight change. /80   Pulse 82   Temp 98.6 °F (37 °C) (Tympanic)   Ht 5' 10\" (1.778 m)   Wt 206 lb (93.4 kg)   SpO2 99%   BMI 29.56 kg/m²    Eyes: Negative for blurred vision. Respiratory: Negative for cough, shortness of breath and wheezing. Cardiovascular: Negative for chest pain, palpitations and leg swelling. Gastrointestinal: Negative for abdominal pain, constipation, diarrhea, nausea and vomiting. Genitourinary: Negative for dysuria. Skin: Negative for color change, pallor, rash and wound. Neurological: Negative for weakness and headaches.    Hematological: Negative for adenopathy. Psychiatric/Behavioral: The patient is not nervous/anxious. Objective:     Physical Exam   Constitutional: He is oriented to person, place, and time. He appears well-developed and well-nourished. HENT:   Head: Normocephalic and atraumatic. Cardiovascular: Normal rate, regular rhythm and normal heart sounds. Pulmonary/Chest: Effort normal and breath sounds normal. No respiratory distress. He has no wheezes. He has no rales. Musculoskeletal: He exhibits no edema (LE). Neurological: He is alert and oriented to person, place, and time. Skin: Skin is warm and dry. No rash noted. No erythema. No pallor. Psychiatric: He has a normal mood and affect. His behavior is normal. Judgment and thought content normal.   Nursing note and vitals reviewed. /80   Pulse 82   Temp 98.6 °F (37 °C) (Tympanic)   Ht 5' 10\" (1.778 m)   Wt 206 lb (93.4 kg)   SpO2 99%   BMI 29.56 kg/m²     Assessment:          Diagnosis Orders   1. Essential hypertension  CBC Auto Differential    Comprehensive Metabolic Panel    Lipid Panel   2. Encounter for screening for lipid disorder  Lipid Panel   3. Need for influenza vaccination  INFLUENZA, QUADV, 3 YRS AND OLDER, IM PF, PREFILL SYR OR SDV, 0.5ML (AFLURIA QUADV, PF)             Plan:      Return in about 6 months (around 3/13/2020) for med review, hypertension. Repeat labs, order given  Cont present medication  Encouraged healthy diet and continue regular exercise  Pt agreed with treatment plan    Health Maintenance reviewed - Flu shot given. Patient given educational materials - see patient instructions. Discussed use, benefit, and side effects of prescribed medications. All patientquestions answered. Pt voiced understanding. Reviewed health maintenance. Instructedto continue current medications, diet and exercise. Patient agreed with treatmentplan. Follow up as directed.      Electronically signed by Abdiaziz Thompson PA-C on 9/13/2019 at 8:02 AM

## 2020-03-02 RX ORDER — SERTRALINE HYDROCHLORIDE 100 MG/1
TABLET, FILM COATED ORAL
Qty: 135 TABLET | Refills: 0 | Status: SHIPPED | OUTPATIENT
Start: 2020-03-02 | End: 2020-06-04

## 2020-03-02 RX ORDER — MELOXICAM 15 MG/1
TABLET ORAL
Qty: 90 TABLET | Refills: 0 | Status: SHIPPED | OUTPATIENT
Start: 2020-03-02 | End: 2020-06-04

## 2020-03-02 RX ORDER — LISINOPRIL 10 MG/1
TABLET ORAL
Qty: 90 TABLET | Refills: 0 | Status: SHIPPED | OUTPATIENT
Start: 2020-03-02 | End: 2020-06-04

## 2020-06-04 RX ORDER — MELOXICAM 15 MG/1
TABLET ORAL
Qty: 90 TABLET | Refills: 0 | Status: SHIPPED | OUTPATIENT
Start: 2020-06-04 | End: 2021-08-25 | Stop reason: ALTCHOICE

## 2020-06-04 RX ORDER — SERTRALINE HYDROCHLORIDE 100 MG/1
TABLET, FILM COATED ORAL
Qty: 135 TABLET | Refills: 0 | Status: SHIPPED | OUTPATIENT
Start: 2020-06-04 | End: 2020-10-21 | Stop reason: SDUPTHER

## 2020-06-04 RX ORDER — LISINOPRIL 10 MG/1
TABLET ORAL
Qty: 90 TABLET | Refills: 0 | Status: SHIPPED
Start: 2020-06-04 | End: 2020-06-24 | Stop reason: DRUGHIGH

## 2020-06-24 ENCOUNTER — OFFICE VISIT (OUTPATIENT)
Dept: FAMILY MEDICINE CLINIC | Age: 35
End: 2020-06-24
Payer: COMMERCIAL

## 2020-06-24 VITALS
WEIGHT: 217 LBS | DIASTOLIC BLOOD PRESSURE: 94 MMHG | TEMPERATURE: 97.2 F | HEIGHT: 70 IN | BODY MASS INDEX: 31.07 KG/M2 | HEART RATE: 79 BPM | SYSTOLIC BLOOD PRESSURE: 146 MMHG | OXYGEN SATURATION: 99 %

## 2020-06-24 PROCEDURE — 99213 OFFICE O/P EST LOW 20 MIN: CPT | Performed by: PHYSICIAN ASSISTANT

## 2020-06-24 RX ORDER — LISINOPRIL 20 MG/1
20 TABLET ORAL DAILY
Qty: 90 TABLET | Refills: 1 | Status: SHIPPED | OUTPATIENT
Start: 2020-06-24 | End: 2020-12-18

## 2020-06-24 ASSESSMENT — ENCOUNTER SYMPTOMS
WHEEZING: 0
BLURRED VISION: 0
DIARRHEA: 0
VOMITING: 0
CONSTIPATION: 0
SHORTNESS OF BREATH: 0
COUGH: 0
COLOR CHANGE: 0
NAUSEA: 0
ABDOMINAL PAIN: 0

## 2020-06-24 ASSESSMENT — PATIENT HEALTH QUESTIONNAIRE - PHQ9
SUM OF ALL RESPONSES TO PHQ QUESTIONS 1-9: 0
SUM OF ALL RESPONSES TO PHQ9 QUESTIONS 1 & 2: 0
1. LITTLE INTEREST OR PLEASURE IN DOING THINGS: 0
SUM OF ALL RESPONSES TO PHQ QUESTIONS 1-9: 0
2. FEELING DOWN, DEPRESSED OR HOPELESS: 0

## 2020-06-24 NOTE — PROGRESS NOTES
Visit Information    Have you changed or started any medications since your last visit including any over-the-counter medicines, vitamins, or herbal medicines? no   Are you having any side effects from any of your medications? -  no  Have you stopped taking any of your medications? Is so, why? -  no    Have you seen any other physician or provider since your last visit? No  Have you had any other diagnostic tests since your last visit? No  Have you been seen in the emergency room and/or had an admission to a hospital since we last saw you? No  Have you had your routine dental cleaning in the past 6 months? no    Have you activated your [employer] account? If not, what are your barriers?  Yes     Patient Care Team:  Becky Oliver PA-C as PCP - General (Family Medicine)  Beck[facility]C [facility]    Medical History Review  Past Medical, Family, and Social History reviewed and does contribute to the patient presenting condition    Health Maintenance   Topic Date Due    Varicella vaccine (1 of 2 - 2-dose childhood series) 05/15/1984    HIV screen  05/15/1998    Potassium monitoring  09/13/2020    Creatinine monitoring  09/13/2020    DTaP/Tdap/Td vaccine (2 - Td) 10/28/2028    Flu vaccine  Completed    Hepatitis A vaccine  Aged Out    Hepatitis B vaccine  Aged Out    Hib vaccine  Aged Out    Meningococcal (ACWY) vaccine  Aged Out    Pneumococcal 0-64 years Vaccine  Aged Out
cuco    Cancer Paternal Grandmother         unknown    Other Paternal Aunt          atrial fib          Social History     Tobacco Use    Smoking status: Never Smoker    Smokeless tobacco: Never Used   Substance Use Topics    Alcohol use: No         Current Outpatient Medications   Medication Sig Dispense Refill    lisinopril (PRINIVIL;ZESTRIL) 20 MG tablet Take 1 tablet by mouth daily 90 tablet 1    sertraline (ZOLOFT) 100 MG tablet TAKE ONE AND ONE-HALF TABLET BY MOUTH DAILY 135 tablet 0    meloxicam (MOBIC) 15 MG tablet TAKE ONE TABLET BY MOUTH DAILY 90 tablet 0    Omega-3 Fatty Acids (FISH OIL) 1000 MG CAPS Take 3,000 mg by mouth 3 times daily      Multiple Vitamins-Minerals (MENS MULTI VITAMIN & MINERAL PO) Take by mouth       No current facility-administered medications for this visit. No Known Allergies    Health Maintenance   Topic Date Due    Varicella vaccine (1 of 2 - 2-dose childhood series) 05/15/1984    HIV screen  05/15/1998    Potassium monitoring  09/13/2020    Creatinine monitoring  09/13/2020    DTaP/Tdap/Td vaccine (2 - Td) 10/28/2028    Flu vaccine  Completed    Hepatitis A vaccine  Aged Out    Hepatitis B vaccine  Aged Out    Hib vaccine  Aged Out    Meningococcal (ACWY) vaccine  Aged Out    Pneumococcal 0-64 years Vaccine  Aged Out       Subjective:     Review of Systems   Constitutional: Negative for activity change, appetite change, fatigue, fever, malaise/fatigue and unexpected weight change. BP (!) 146/94 (Site: Right Upper Arm, Position: Sitting, Cuff Size: Medium Adult)   Pulse 79   Temp 97.2 °F (36.2 °C) (Tympanic)   Ht 5' 10\" (1.778 m)   Wt 217 lb (98.4 kg)   SpO2 99%   BMI 31.14 kg/m²    Eyes: Negative for blurred vision. Respiratory: Negative for cough, shortness of breath and wheezing. Cardiovascular: Negative for chest pain, palpitations and leg swelling.    Gastrointestinal: Negative for abdominal pain, constipation, diarrhea,

## 2020-09-10 ENCOUNTER — OFFICE VISIT (OUTPATIENT)
Dept: FAMILY MEDICINE CLINIC | Age: 35
End: 2020-09-10
Payer: COMMERCIAL

## 2020-09-10 VITALS
HEIGHT: 70 IN | DIASTOLIC BLOOD PRESSURE: 84 MMHG | BODY MASS INDEX: 31.64 KG/M2 | SYSTOLIC BLOOD PRESSURE: 138 MMHG | WEIGHT: 221 LBS | RESPIRATION RATE: 18 BRPM | HEART RATE: 73 BPM | OXYGEN SATURATION: 98 %

## 2020-09-10 PROCEDURE — 99213 OFFICE O/P EST LOW 20 MIN: CPT | Performed by: INTERNAL MEDICINE

## 2020-09-10 RX ORDER — PREDNISONE 20 MG/1
20 TABLET ORAL DAILY
Qty: 7 TABLET | Refills: 0 | Status: SHIPPED | OUTPATIENT
Start: 2020-09-10 | End: 2020-09-17

## 2020-09-10 ASSESSMENT — ENCOUNTER SYMPTOMS
SHORTNESS OF BREATH: 0
SORE THROAT: 0
DIARRHEA: 0
VOMITING: 0
CONSTIPATION: 0
WHEEZING: 0
SINUS PAIN: 0
TROUBLE SWALLOWING: 0
CHEST TIGHTNESS: 0
VOICE CHANGE: 0
RHINORRHEA: 0
SINUS PRESSURE: 0
STRIDOR: 0
ABDOMINAL PAIN: 0
COUGH: 0

## 2020-09-10 NOTE — PROGRESS NOTES
[facility]  [address] [facility] [address] [address]  Dept: [phone]  Dept Fax: [phone]    Justa Martinez a 38 y.o. male who presents today for his medical conditions/complaints as notedbelow. Vicki Griggs is c/o of   Chief Complaint   Patient presents with    Otalgia     left-since denist visit on friday       HPI:     Otalgia    There is pain in the left ear. This is a new problem. The problem occurs constantly. The problem has been unchanged. There has been no fever. The fever has been present for less than 1 day. The pain is at a severity of 6/10. The pain is moderate. Associated symptoms include ear discharge. Pertinent negatives include no abdominal pain, coughing, diarrhea, headaches, hearing loss, neck pain, rash, rhinorrhea, sore throat or vomiting. He has tried NSAIDs, cold packs and heat packs for the symptoms. The treatment provided mild relief. There is no history of a chronic ear infection, hearing loss or a tympanostomy tube.        No results found for: LABA1C      ( goal A1C is < 7)   No results found for: LABMICR  LDL Cholesterol (mg/dL)   Date Value   09/13/2019 127   02/08/2019 139 (H)   09/19/2017 114       (goal LDL is <100)   AST (U/L)   Date Value   09/13/2019 25     ALT (U/L)   Date Value   09/13/2019 24     BUN (mg/dL)   Date Value   09/13/2019 24 (H)     BP Readings from Last 3 Encounters:   09/10/20 138/84   06/24/20 (!) 146/94   09/13/19 132/80          (goal 120/80)    Past Medical History:   Diagnosis Date    Anxiety     Bulging lumbar disc     Chronic dryness of both eyes     Hypertension       Past Surgical History:   Procedure Laterality Date    EYE SURGERY      SEPTOPLASTY      TONSILLECTOMY         Family History   Problem Relation Age of Onset    Diabetes Maternal Grandfather     Other Mother         cholelithiasis    Other Father         atrial fib, mitral valve repair    Other Maternal Grandmother         alzeimers    Cancer Paternal Grandmother         unknown    Other Paternal Aunt          atrial fib       Social History     Tobacco Use    Smoking status: Never Smoker    Smokeless tobacco: Never Used   Substance Use Topics    Alcohol use: No      Current Outpatient Medications   Medication Sig Dispense Refill    neomycin-polymyxin-hydrocortisone (CORTISPORIN) 3.5-69042-0 otic solution Place 4 drops into the left ear 3 times daily for 10 days 1 Bottle 0    predniSONE (DELTASONE) 20 MG tablet Take 1 tablet by mouth daily for 7 days 7 tablet 0    lisinopril (PRINIVIL;ZESTRIL) 20 MG tablet Take 1 tablet by mouth daily 90 tablet 1    sertraline (ZOLOFT) 100 MG tablet TAKE ONE AND ONE-HALF TABLET BY MOUTH DAILY 135 tablet 0    Omega-3 Fatty Acids (FISH OIL) 1000 MG CAPS Take 3,000 mg by mouth 3 times daily      Multiple Vitamins-Minerals (MENS MULTI VITAMIN & MINERAL PO) Take by mouth      meloxicam (MOBIC) 15 MG tablet TAKE ONE TABLET BY MOUTH DAILY (Patient not taking: Reported on 9/10/2020) 90 tablet 0     No current facility-administered medications for this visit. No Known Allergies       Health Maintenance   Topic Date Due    Varicella vaccine (1 of 2 - 2-dose childhood series) 05/15/1984    HIV screen  05/15/1998    Flu vaccine (1) 09/01/2020    Potassium monitoring  09/13/2020    Creatinine monitoring  09/13/2020    DTaP/Tdap/Td vaccine (2 - Td) 10/28/2028    Hepatitis A vaccine  Aged Out    Hepatitis B vaccine  Aged Out    Hib vaccine  Aged Out    Meningococcal (ACWY) vaccine  Aged Out    Pneumococcal 0-64 years Vaccine  Aged Out       Subjective:     Review of Systems   Constitutional: Negative for activity change, appetite change, chills, diaphoresis, fatigue, fever and unexpected weight change. HENT: Positive for ear discharge and ear pain.  Negative for congestion, dental problem, drooling, hearing loss, mouth sores, nosebleeds, postnasal drip, rhinorrhea, sinus pressure, sinus pain, sneezing, sore throat, tinnitus, trouble swallowing and voice change. Eyes: Negative for visual disturbance. Respiratory: Negative for cough, chest tightness, shortness of breath, wheezing and stridor. Cardiovascular: Negative for chest pain and leg swelling. Gastrointestinal: Negative for abdominal pain, constipation, diarrhea and vomiting. Musculoskeletal: Negative for arthralgias and neck pain. Skin: Negative for rash. Allergic/Immunologic: Negative for immunocompromised state. Neurological: Negative for dizziness, light-headedness and headaches. Psychiatric/Behavioral: Negative for sleep disturbance. Objective:      Physical Exam  Vitals signs and nursing note reviewed. Constitutional:       General: He is not in acute distress. Appearance: He is not ill-appearing, toxic-appearing or diaphoretic. HENT:      Head: Normocephalic and atraumatic. Right Ear: Hearing, tympanic membrane, ear canal and external ear normal.      Left Ear: Decreased hearing noted. Drainage, swelling and tenderness present. A middle ear effusion is present. Tympanic membrane is erythematous. Nose: Nasal deformity and rhinorrhea present. No congestion. Rhinorrhea is purulent. Right Turbinates: Not enlarged. Left Turbinates: Not enlarged or swollen. Right Sinus: No maxillary sinus tenderness or frontal sinus tenderness. Left Sinus: No maxillary sinus tenderness or frontal sinus tenderness. Mouth/Throat:      Lips: Pink. Mouth: Mucous membranes are moist.   Neck:      Musculoskeletal: Normal range of motion and neck supple. Cardiovascular:      Rate and Rhythm: Normal rate and regular rhythm. Lymphadenopathy:      Cervical: No cervical adenopathy. Neurological:      Mental Status: He is alert.        /84   Pulse 73   Resp 18   Ht 5' 10\" (1.778 m)   Wt 221 lb (100.2 kg)   SpO2 98%   BMI 31.71 kg/m²     Assessment: Diagnosis Orders   1. Non-recurrent acute suppurative otitis media of left ear without spontaneous rupture of tympanic membrane               Plan:       Return if symptoms worsen or fail to improve. No orders of the defined types were placed in this encounter. Orders Placed This Encounter   Medications    neomycin-polymyxin-hydrocortisone (CORTISPORIN) 3.5-01246-5 otic solution     Sig: Place 4 drops into the left ear 3 times daily for 10 days     Dispense:  1 Bottle     Refill:  0    predniSONE (DELTASONE) 20 MG tablet     Sig: Take 1 tablet by mouth daily for 7 days     Dispense:  7 tablet     Refill:  0    prednisone in am for 7 days   Plenty of fluids and rest  Drops 3-4 times daily for 7-10 days to left ear. F/u if sxs worsen or persist   Call with q/c    Patientgiven educational materials - see patient instructions. Discussed use, benefit,and side effects of prescribed medications. All patient questions answered. Ptvoiced understanding. Reviewed health maintenance. Instructed to continue currentmedications, diet and exercise. Patient agreed with treatment plan. Follow up asdirected.      Electronically signed by Tatum Smith DO on 9/10/2020 at 12:34 PM

## 2020-10-13 ENCOUNTER — HOSPITAL ENCOUNTER (OUTPATIENT)
Age: 35
Setting detail: SPECIMEN
Discharge: HOME | End: 2020-10-13
Payer: COMMERCIAL

## 2020-10-13 LAB
ABSOLUTE EOS #: 0.1 K/UL (ref 0–0.44)
ABSOLUTE IMMATURE GRANULOCYTE: 0.08 K/UL (ref 0–0.3)
ABSOLUTE LYMPH #: 3.16 K/UL (ref 1.1–3.7)
ABSOLUTE MONO #: 0.74 K/UL (ref 0.1–1.2)
ALBUMIN SERPL-MCNC: 4.5 G/DL (ref 3.5–5.2)
ALBUMIN/GLOBULIN RATIO: 1.5 (ref 1–2.5)
ALP BLD-CCNC: 66 U/L (ref 40–129)
ALT SERPL-CCNC: 34 U/L (ref 5–41)
ANION GAP SERPL CALCULATED.3IONS-SCNC: 16 MMOL/L (ref 9–17)
AST SERPL-CCNC: 30 U/L
BASOPHILS # BLD: 1 % (ref 0–2)
BASOPHILS ABSOLUTE: 0.04 K/UL (ref 0–0.2)
BILIRUB SERPL-MCNC: 0.78 MG/DL (ref 0.3–1.2)
BUN BLDV-MCNC: 17 MG/DL (ref 6–20)
BUN/CREAT BLD: NORMAL (ref 9–20)
CALCIUM SERPL-MCNC: 9.8 MG/DL (ref 8.6–10.4)
CHLORIDE BLD-SCNC: 100 MMOL/L (ref 98–107)
CHOLESTEROL/HDL RATIO: 7.1
CHOLESTEROL: 282 MG/DL
CO2: 22 MMOL/L (ref 20–31)
CREAT SERPL-MCNC: 1.14 MG/DL (ref 0.7–1.2)
DIFFERENTIAL TYPE: ABNORMAL
EOSINOPHILS RELATIVE PERCENT: 1 % (ref 1–4)
GFR AFRICAN AMERICAN: >60 ML/MIN
GFR NON-AFRICAN AMERICAN: >60 ML/MIN
GFR SERPL CREATININE-BSD FRML MDRD: NORMAL ML/MIN/{1.73_M2}
GFR SERPL CREATININE-BSD FRML MDRD: NORMAL ML/MIN/{1.73_M2}
GLUCOSE BLD-MCNC: 84 MG/DL (ref 70–99)
HCT VFR BLD CALC: 50.6 % (ref 40.7–50.3)
HDLC SERPL-MCNC: 40 MG/DL
HEMOGLOBIN: 17.1 G/DL (ref 13–17)
IMMATURE GRANULOCYTES: 1 %
LDL CHOLESTEROL DIRECT: 153 MG/DL
LDL CHOLESTEROL: ABNORMAL MG/DL (ref 0–130)
LYMPHOCYTES # BLD: 42 % (ref 24–43)
MCH RBC QN AUTO: 30.9 PG (ref 25.2–33.5)
MCHC RBC AUTO-ENTMCNC: 33.8 G/DL (ref 28.4–34.8)
MCV RBC AUTO: 91.3 FL (ref 82.6–102.9)
MONOCYTES # BLD: 10 % (ref 3–12)
NRBC AUTOMATED: 0 PER 100 WBC
PDW BLD-RTO: 12.1 % (ref 11.8–14.4)
PLATELET # BLD: 228 K/UL (ref 138–453)
PLATELET ESTIMATE: ABNORMAL
PMV BLD AUTO: 10.8 FL (ref 8.1–13.5)
POTASSIUM SERPL-SCNC: 4.5 MMOL/L (ref 3.7–5.3)
RBC # BLD: 5.54 M/UL (ref 4.21–5.77)
RBC # BLD: ABNORMAL 10*6/UL
SEG NEUTROPHILS: 45 % (ref 36–65)
SEGMENTED NEUTROPHILS ABSOLUTE COUNT: 3.5 K/UL (ref 1.5–8.1)
SODIUM BLD-SCNC: 138 MMOL/L (ref 135–144)
TOTAL PROTEIN: 7.5 G/DL (ref 6.4–8.3)
TRIGL SERPL-MCNC: 508 MG/DL
VLDLC SERPL CALC-MCNC: ABNORMAL MG/DL (ref 1–30)
WBC # BLD: 7.6 K/UL (ref 3.5–11.3)
WBC # BLD: ABNORMAL 10*3/UL

## 2020-10-21 ENCOUNTER — OFFICE VISIT (OUTPATIENT)
Dept: FAMILY MEDICINE CLINIC | Age: 35
End: 2020-10-21
Payer: COMMERCIAL

## 2020-10-21 VITALS
DIASTOLIC BLOOD PRESSURE: 90 MMHG | TEMPERATURE: 97.2 F | OXYGEN SATURATION: 99 % | BODY MASS INDEX: 31.07 KG/M2 | HEART RATE: 88 BPM | HEIGHT: 70 IN | WEIGHT: 217 LBS | SYSTOLIC BLOOD PRESSURE: 142 MMHG

## 2020-10-21 PROCEDURE — 90686 IIV4 VACC NO PRSV 0.5 ML IM: CPT | Performed by: PHYSICIAN ASSISTANT

## 2020-10-21 PROCEDURE — 99213 OFFICE O/P EST LOW 20 MIN: CPT | Performed by: PHYSICIAN ASSISTANT

## 2020-10-21 PROCEDURE — 90471 IMMUNIZATION ADMIN: CPT | Performed by: PHYSICIAN ASSISTANT

## 2020-10-21 RX ORDER — SERTRALINE HYDROCHLORIDE 100 MG/1
TABLET, FILM COATED ORAL
Qty: 135 TABLET | Refills: 1 | Status: SHIPPED | OUTPATIENT
Start: 2020-10-21 | End: 2021-05-13

## 2020-10-21 ASSESSMENT — ENCOUNTER SYMPTOMS
COLOR CHANGE: 0
ABDOMINAL PAIN: 0
SHORTNESS OF BREATH: 0
CONSTIPATION: 0
DIARRHEA: 0
COUGH: 0
NAUSEA: 0
WHEEZING: 0
VOMITING: 0

## 2020-10-21 NOTE — PROGRESS NOTES
Visit Information    Have you changed or started any medications since your last visit including any over-the-counter medicines, vitamins, or herbal medicines? no   Are you having any side effects from any of your medications? -  no  Have you stopped taking any of your medications? Is so, why? -  no    Have you seen any other physician or provider since your last visit? No  Have you had any other diagnostic tests since your last visit? No  Have you been seen in the emergency room and/or had an admission to a hospital since we last saw you? No  Have you had your routine dental cleaning in the past 6 months? no    Have you activated your [employer] account? If not, what are your barriers? Yes     Patient Care Team:  Annmarie Thapa PA-C as PCP - General (Family Medicine)  Annmarie Thapa PA-C as PCP - [facility]    Medical History Review  Past Medical, Family, and Social History reviewed and does contribute to the patient presenting condition    Health Maintenance   Topic Date Due    Varicella vaccine (1 of 2 - 2-dose childhood series) 05/15/1984    HIV screen  05/15/1998    Flu vaccine (1) 09/01/2020    Potassium monitoring  10/13/2021    Creatinine monitoring  10/13/2021    DTaP/Tdap/Td vaccine (2 - Td) 10/28/2028    Hepatitis A vaccine  Aged Out    Hepatitis B vaccine  Aged Out    Hib vaccine  Aged Out    Meningococcal (ACWY) vaccine  Aged Out    Pneumococcal 0-64 years Vaccine  Aged Out     After obtaining consent, and per orders of [facility], injection of influenza given in Right deltoid by Yaneli Blade. Patient instructed to remain in clinic for 20 minutes afterwards, and to report any adverse reaction to me immediately. Vaccine Information Sheet, \"Influenza - Inactivated\"  given to Dejon Welsh, or parent/legal guardian of  Dejon Welsh and verbalized understanding. Patient responses:    Have you ever had a reaction to a flu vaccine?  No  Do you have any current

## 2020-10-21 NOTE — PROGRESS NOTES
[facility]  [address] [address]  Dept: [phone]  Dept Fax: [phone]    Alisia Shipman is a 38 y.o. male who presents today for his medical conditions/complaintsas noted below. Alisia Shipman is c/o of   Chief Complaint   Patient presents with    Hypertension    Discuss Labs         HPI:     Hypertension   This is a chronic problem. The current episode started more than 1 year ago. The problem is unchanged. The problem is controlled. Pertinent negatives include no chest pain, headaches, palpitations, peripheral edema or shortness of breath. Risk factors for coronary artery disease include male gender. Past treatments include ACE inhibitors. The current treatment provides moderate improvement. There are no compliance problems. patient states he does check his BP at home regularly. He sees around 130/80. He states higher today as did not sleep last night. Last visit was lower. He would also like to review recent labs. He states he did them after being on vacation and diet not as good. He believes he did fast prior to the labs. Patient states eats very little fried foods. Does love eating cheese, red meat is 1-2 times a week. Mainly chicken. Some fish/crab meat. He is also trying to run more.      No results found for: LABA1C          ( goal A1Cis < 7)   No results found for: LABMICR  LDL Cholesterol (mg/dL)   Date Value   10/13/2020        09/13/2019 127   02/08/2019 139 (H)       (goal LDL is <100)   AST (U/L)   Date Value   10/13/2020 30     ALT (U/L)   Date Value   10/13/2020 34     BUN (mg/dL)   Date Value   10/13/2020 17     BP Readings from Last 3 Encounters:   10/21/20 (!) 142/90   09/10/20 138/84   06/24/20 (!) 146/94          (goal 120/80)    Past Medical History:   Diagnosis Date    Anxiety     Bulging lumbar disc     Chronic dryness of both eyes     Hypertension       Past Surgical History:   Procedure Laterality Date    EYE SURGERY      SEPTOPLASTY      TONSILLECTOMY         Family History   Problem Relation Age of Onset    Diabetes Maternal Grandfather     Other Mother         cholelithiasis    Other Father         atrial fib, mitral valve repair    Other Maternal Grandmother         alzeimers    Cancer Paternal Grandmother         unknown    Other Paternal Aunt          atrial fib       Social History     Tobacco Use    Smoking status: Never Smoker    Smokeless tobacco: Never Used   Substance Use Topics    Alcohol use: No      Current Outpatient Medications   Medication Sig Dispense Refill    sertraline (ZOLOFT) 100 MG tablet TAKE ONE AND ONE-HALF TABLET BY MOUTH DAILY 135 tablet 1    lisinopril (PRINIVIL;ZESTRIL) 20 MG tablet Take 1 tablet by mouth daily 90 tablet 1    Omega-3 Fatty Acids (FISH OIL) 1000 MG CAPS Take 3,000 mg by mouth 3 times daily      Multiple Vitamins-Minerals (MENS MULTI VITAMIN & MINERAL PO) Take by mouth      meloxicam (MOBIC) 15 MG tablet TAKE ONE TABLET BY MOUTH DAILY (Patient not taking: No sig reported) 90 tablet 0     No current facility-administered medications for this visit. No Known Allergies    Health Maintenance   Topic Date Due    Varicella vaccine (1 of 2 - 2-dose childhood series) 05/15/1984    HIV screen  05/15/1998    Potassium monitoring  10/13/2021    Creatinine monitoring  10/13/2021    DTaP/Tdap/Td vaccine (2 - Td) 10/28/2028    Flu vaccine  Completed    Hepatitis A vaccine  Aged Out    Hepatitis B vaccine  Aged Out    Hib vaccine  Aged Out    Meningococcal (ACWY) vaccine  Aged Out    Pneumococcal 0-64 years Vaccine  Aged Out       Subjective:     Review of Systems   Constitutional: Negative for activity change, appetite change, fatigue, fever and unexpected weight change.         BP (!) 142/90 (Site: Right Upper Arm, Position: Sitting, Cuff Size: Medium Adult)   Pulse 88   Temp 97.2 °F (36.2 °C) (Temporal)   Ht 5' 10\" (1.778 m) Wt 217 lb (98.4 kg)   SpO2 99%   BMI 31.14 kg/m²    Respiratory: Negative for cough, shortness of breath and wheezing. Cardiovascular: Negative for chest pain and palpitations. Gastrointestinal: Negative for abdominal pain, constipation, diarrhea, nausea and vomiting. Skin: Negative for color change, pallor, rash and wound. Neurological: Negative for weakness and headaches. Hematological: Negative for adenopathy. Psychiatric/Behavioral: Positive for sleep disturbance. The patient is not nervous/anxious. Objective:     Physical Exam  Vitals signs and nursing note reviewed. Constitutional:       General: He is not in acute distress. Appearance: Normal appearance. He is well-developed. He is not ill-appearing. HENT:      Head: Normocephalic and atraumatic. Cardiovascular:      Rate and Rhythm: Normal rate and regular rhythm. Pulmonary:      Effort: Pulmonary effort is normal. No respiratory distress. Breath sounds: Normal breath sounds. No wheezing or rales. Skin:     General: Skin is warm and dry. Coloration: Skin is not pale. Findings: No erythema or rash. Neurological:      Mental Status: He is alert and oriented to person, place, and time. Psychiatric:         Mood and Affect: Mood normal.         Behavior: Behavior normal.         Thought Content: Thought content normal.         Judgment: Judgment normal.       BP (!) 142/90 (Site: Right Upper Arm, Position: Sitting, Cuff Size: Medium Adult)   Pulse 88   Temp 97.2 °F (36.2 °C) (Temporal)   Ht 5' 10\" (1.778 m)   Wt 217 lb (98.4 kg)   SpO2 99%   BMI 31.14 kg/m²     Assessment:       Diagnosis Orders   1. Essential hypertension     2. Need for influenza vaccination  INFLUENZA, QUADV, 3 YRS AND OLDER, IM PF, PREFILL SYR OR SDV, 0.5ML (AFLURIA QUADV, PF)   3. Dyslipidemia  Lipid Panel    Comprehensive Metabolic Panel   4.  Anxiety  sertraline (ZOLOFT) 100 MG tablet             Plan:      Return in about 3 months (around 1/21/2021) for HLD.     Reviewed recent labs  Stressed need for dietary changes to help lower cholesterol  Hand out given  Patient agreed to changes in diet and continuing regular exercise  Will repeat labs in 3 mo  Cont present medications  Flu shot given  Patient agreed with treatment plan  Lab Results   Component Value Date    WBC 7.6 10/13/2020    HGB 17.1 (H) 10/13/2020    HCT 50.6 (H) 10/13/2020    MCV 91.3 10/13/2020     10/13/2020     Lab Results   Component Value Date     10/13/2020    K 4.5 10/13/2020     10/13/2020    CO2 22 10/13/2020    BUN 17 10/13/2020    CREATININE 1.14 10/13/2020    GLUCOSE 84 10/13/2020    CALCIUM 9.8 10/13/2020    PROT 7.5 10/13/2020    LABALBU 4.5 10/13/2020    BILITOT 0.78 10/13/2020    ALKPHOS 66 10/13/2020    AST 30 10/13/2020    ALT 34 10/13/2020    LABGLOM >60 10/13/2020    GFRAA >60 10/13/2020       Lab Results   Component Value Date    CHOL 282 (H) 10/13/2020    CHOL 206 (H) 09/13/2019    CHOL 216 (H) 02/08/2019     Lab Results   Component Value Date    TRIG 508 (H) 10/13/2020    TRIG 194 (H) 09/13/2019    TRIG 197 (H) 02/08/2019     Lab Results   Component Value Date    HDL 40 (L) 10/13/2020    HDL 40 (L) 09/13/2019    HDL 38 (L) 02/08/2019     Lab Results   Component Value Date    LDLCHOLESTEROL      10/13/2020    LDLCHOLESTEROL 127 09/13/2019    LDLCHOLESTEROL 139 (H) 02/08/2019     Lab Results   Component Value Date    VLDL NOT REPORTED 10/13/2020    VLDL NOT REPORTED (H) 09/13/2019    VLDL NOT REPORTED 02/08/2019     Lab Results   Component Value Date    CHOLHDLRATIO 7.1 (H) 10/13/2020    CHOLHDLRATIO 5.2 (H) 09/13/2019    CHOLHDLRATIO 5.7 (H) 02/08/2019     No results found for: LABA1C  No results found for: EAG      Orders Placed This Encounter   Procedures    INFLUENZA, QUADV, 3 YRS AND OLDER, IM PF, PREFILL SYR OR SDV, 0.5ML (AFLURIA QUADV, PF)    Lipid Panel     Standing Status:   Future     Standing Expiration Date: 10/21/2021     Order Specific Question:   Is Patient Fasting?/# of Hours     Answer:   yes    Comprehensive Metabolic Panel     Standing Status:   Future     Standing Expiration Date:   10/21/2021     Health Maintenance reviewed - Flu shot given. Patient given educational materials - see patient instructions. Discussed use, benefit, and side effects of prescribed medications. All patientquestions answered. Pt voiced understanding. Reviewed health maintenance. Instructedto continue current medications, diet and exercise. Patient agreed with treatmentplan. Follow up as directed.      Electronically signed by Tete Moore PA-C on 10/21/2020 at 8:54 AM

## 2020-10-21 NOTE — PATIENT INSTRUCTIONS
Patient Education        Learning About High Cholesterol  What is high cholesterol? High cholesterol means that you have too much cholesterol in your blood. Cholesterol is a type of fat. It's needed for many body functions, such as making new cells. Cholesterol is made by your body. It also comes from food you eat. Having high cholesterol can lead to the buildup of plaque in artery walls. This can increase your risk of heart disease and stroke. When your doctor talks about high cholesterol levels, he or she is talking about your total cholesterol and LDL cholesterol (the \"bad\" cholesterol) levels. Your doctor may also speak about HDL (the \"good\" cholesterol) levels. High HDL is linked with a lower risk for heart disease, heart attack, and stroke. Your cholesterol levels help your doctor find out your risk for having a heart attack or stroke. How can you prevent high cholesterol? A heart-healthy lifestyle can help you prevent high cholesterol. This lifestyle helps lower your risk for a heart attack and stroke. · Eat heart-healthy foods. ? Eat fruits, vegetables, whole grains (like oatmeal), dried beans and peas, nuts and seeds, soy products (like tofu), and fat-free or low-fat dairy products. ? Replace butter, margarine, and hydrogenated or partially hydrogenated oils with olive and canola oils. (Canola oil margarine without trans fat is fine.)  ? Replace red meat with fish, poultry, and soy protein (like tofu). ? Limit processed and packaged foods like chips, crackers, and cookies. · Be active. Exercise can improve your cholesterol level. Get at least 30 minutes of exercise on most days of the week. Walking is a good choice. You also may want to do other activities, such as running, swimming, cycling, or playing tennis or team sports. · Stay at a healthy weight. Lose weight if you need to. · Don't smoke. If you need help quitting, talk to your doctor about stop-smoking programs and medicines.  These can increase your chances of quitting for good. How is high cholesterol treated? The goal of treatment is to reduce your chances of having a heart attack or stroke. The goal is not to lower your cholesterol numbers only. · You may make lifestyle changes, such as eating healthy foods, not smoking, losing weight, and being more active. · You may have to take medicine. Follow-up care is a key part of your treatment and safety. Be sure to make and go to all appointments, and call your doctor if you are having problems. It's also a good idea to know your test results and keep a list of the medicines you take. Where can you learn more? Go to https://[employer]. org and sign in to your [employer] account. Enter F767 in the [employer] box to learn more about \"Learning About High Cholesterol. \"     If you do not have an account, please click on the \"Sign Up Now\" link. Current as of: December 16, 2019               Content Version: 12.6  © 1483-8936 [employer]. Care instructions adapted under license by [facility] ([facility]). If you have questions about a medical condition or this instruction, always ask your healthcare professional. [place] [address] any warranty or liability for your use of this information.

## 2020-12-18 RX ORDER — LISINOPRIL 20 MG/1
TABLET ORAL
Qty: 90 TABLET | Refills: 0 | Status: SHIPPED
Start: 2020-12-18 | End: 2021-02-23 | Stop reason: DRUGHIGH

## 2021-02-18 ENCOUNTER — HOSPITAL ENCOUNTER (OUTPATIENT)
Age: 36
Setting detail: SPECIMEN
Discharge: HOME | End: 2021-02-18
Payer: COMMERCIAL

## 2021-02-18 DIAGNOSIS — E78.5 DYSLIPIDEMIA: ICD-10-CM

## 2021-02-18 LAB
ALBUMIN SERPL-MCNC: 4.6 G/DL (ref 3.5–5.2)
ALBUMIN/GLOBULIN RATIO: 1.6 (ref 1–2.5)
ALP BLD-CCNC: 73 U/L (ref 40–129)
ALT SERPL-CCNC: 35 U/L (ref 5–41)
ANION GAP SERPL CALCULATED.3IONS-SCNC: 13 MMOL/L (ref 9–17)
AST SERPL-CCNC: 29 U/L
BILIRUB SERPL-MCNC: 0.92 MG/DL (ref 0.3–1.2)
BUN BLDV-MCNC: 18 MG/DL (ref 6–20)
BUN/CREAT BLD: NORMAL (ref 9–20)
CALCIUM SERPL-MCNC: 9.8 MG/DL (ref 8.6–10.4)
CHLORIDE BLD-SCNC: 101 MMOL/L (ref 98–107)
CHOLESTEROL/HDL RATIO: 6.1
CHOLESTEROL: 236 MG/DL
CO2: 24 MMOL/L (ref 20–31)
CREAT SERPL-MCNC: 1.03 MG/DL (ref 0.7–1.2)
GFR AFRICAN AMERICAN: >60 ML/MIN
GFR NON-AFRICAN AMERICAN: >60 ML/MIN
GFR SERPL CREATININE-BSD FRML MDRD: NORMAL ML/MIN/{1.73_M2}
GFR SERPL CREATININE-BSD FRML MDRD: NORMAL ML/MIN/{1.73_M2}
GLUCOSE BLD-MCNC: 88 MG/DL (ref 70–99)
HDLC SERPL-MCNC: 39 MG/DL
LDL CHOLESTEROL: 137 MG/DL (ref 0–130)
POTASSIUM SERPL-SCNC: 4.4 MMOL/L (ref 3.7–5.3)
SODIUM BLD-SCNC: 138 MMOL/L (ref 135–144)
TOTAL PROTEIN: 7.4 G/DL (ref 6.4–8.3)
TRIGL SERPL-MCNC: 302 MG/DL
VLDLC SERPL CALC-MCNC: ABNORMAL MG/DL (ref 1–30)

## 2021-02-23 ENCOUNTER — OFFICE VISIT (OUTPATIENT)
Dept: FAMILY MEDICINE CLINIC | Age: 36
End: 2021-02-23
Payer: COMMERCIAL

## 2021-02-23 VITALS
DIASTOLIC BLOOD PRESSURE: 94 MMHG | SYSTOLIC BLOOD PRESSURE: 134 MMHG | HEIGHT: 70 IN | BODY MASS INDEX: 31.5 KG/M2 | OXYGEN SATURATION: 96 % | HEART RATE: 81 BPM | WEIGHT: 220 LBS

## 2021-02-23 DIAGNOSIS — E78.5 DYSLIPIDEMIA: ICD-10-CM

## 2021-02-23 DIAGNOSIS — I10 ESSENTIAL HYPERTENSION: Primary | ICD-10-CM

## 2021-02-23 PROCEDURE — 99213 OFFICE O/P EST LOW 20 MIN: CPT | Performed by: PHYSICIAN ASSISTANT

## 2021-02-23 RX ORDER — LISINOPRIL 30 MG/1
30 TABLET ORAL DAILY
Qty: 90 TABLET | Refills: 1 | Status: SHIPPED | OUTPATIENT
Start: 2021-02-23 | End: 2021-08-25 | Stop reason: SDUPTHER

## 2021-02-23 ASSESSMENT — ENCOUNTER SYMPTOMS
COLOR CHANGE: 0
COUGH: 0
CONSTIPATION: 0
DIARRHEA: 0
NAUSEA: 0
SHORTNESS OF BREATH: 0
VOMITING: 0
ABDOMINAL PAIN: 0

## 2021-02-23 ASSESSMENT — PATIENT HEALTH QUESTIONNAIRE - PHQ9
SUM OF ALL RESPONSES TO PHQ QUESTIONS 1-9: 0
SUM OF ALL RESPONSES TO PHQ QUESTIONS 1-9: 0

## 2021-02-23 NOTE — PROGRESS NOTES
Visit Information    Have you changed or started any medications since your last visit including any over-the-counter medicines, vitamins, or herbal medicines? no   Are you having any side effects from any of your medications? -  no  Have you stopped taking any of your medications? Is so, why? -  no    Have you seen any other physician or provider since your last visit? No  Have you had any other diagnostic tests since your last visit? No  Have you been seen in the emergency room and/or had an admission to a hospital since we last saw you? No  Have you had your routine dental cleaning in the past 6 months? no    Have you activated your [employer] account? If not, what are your barriers?  No:      Patient Care Team:  Kapil Villarreal PA-C as PCP - General (Family Medicine)  Kapil Villarreal PA-C as PCP - [address] [place] [address] [facility]    Medical History Review  Past Medical, Family, and Social History reviewed and does contribute to the patient presenting condition    Health Maintenance   Topic Date Due    Hepatitis C screen  1983    Varicella vaccine (1 of 2 - 2-dose childhood series) 05/15/1984    HIV screen  05/15/1998    Potassium monitoring  02/18/2022    Creatinine monitoring  02/18/2022    DTaP/Tdap/Td vaccine (2 - Td) 10/28/2028    Flu vaccine  Completed    Hepatitis A vaccine  Aged Out    Hepatitis B vaccine  Aged Out    Hib vaccine  Aged Out    Meningococcal (ACWY) vaccine  Aged Out    Pneumococcal 0-64 years Vaccine  Aged Out

## 2021-02-23 NOTE — PROGRESS NOTES
[address] [facility]  [address]  [address] [facility] [address]  Dept: [phone]  Dept Fax: [phone]    Shyanne Ferguson is a 38 y.o. male who presents today for his medical conditions/complaintsas noted below. Shyanne Ferguson is c/o of   Chief Complaint   Patient presents with    Hypertension    Discuss Labs         HPI:     Hypertension  This is a chronic problem. The current episode started more than 1 year ago. The problem is unchanged. The problem is uncontrolled. Pertinent negatives include no anxiety, chest pain, headaches, peripheral edema or shortness of breath. Risk factors for coronary artery disease include family history and male gender. Past treatments include ACE inhibitors. The current treatment provides moderate improvement. patient also here to review recent labs. He states he fasted better this time for his labs. He reports his father had a CVA recently so he has been busy with that and not eating as well. He has been exercising a lot more since the fall. He has also been snowboarding and shoveling snow often this winter. He tolerates the activity well.    He has not been checking his BP at all    No results found for: LABA1C          ( goal A1Cis < 7)   No results found for: LABMICR  LDL Cholesterol (mg/dL)   Date Value   02/18/2021 137 (H)   10/13/2020        09/13/2019 127       (goal LDL is <100)   AST (U/L)   Date Value   02/18/2021 29     ALT (U/L)   Date Value   02/18/2021 35     BUN (mg/dL)   Date Value   02/18/2021 18     BP Readings from Last 3 Encounters:   02/23/21 (!) 134/94   10/21/20 (!) 142/90   09/10/20 138/84          (goal 120/80)    Past Medical History:   Diagnosis Date    Anxiety     Bulging lumbar disc     Chronic dryness of both eyes     Hyperlipidemia     Hypertension       Past Surgical History:   Procedure Laterality Date    EYE SURGERY      SEPTOPLASTY      TONSILLECTOMY         Family History Problem Relation Age of Onset    Diabetes Maternal Grandfather     Other Mother         cholelithiasis    Other Father         atrial fib, mitral valve repair    Stroke Father     Other Maternal Grandmother         alzeimers    Cancer Paternal Grandmother         unknown    Other Paternal Aunt          atrial fib       Social History     Tobacco Use    Smoking status: Never Smoker    Smokeless tobacco: Never Used   Substance Use Topics    Alcohol use: No      Current Outpatient Medications   Medication Sig Dispense Refill    lisinopril (PRINIVIL;ZESTRIL) 30 MG tablet Take 1 tablet by mouth daily 90 tablet 1    sertraline (ZOLOFT) 100 MG tablet TAKE ONE AND ONE-HALF TABLET BY MOUTH DAILY 135 tablet 1    meloxicam (MOBIC) 15 MG tablet TAKE ONE TABLET BY MOUTH DAILY 90 tablet 0    Omega-3 Fatty Acids (FISH OIL) 1000 MG CAPS Take 3,000 mg by mouth 3 times daily      Multiple Vitamins-Minerals (MENS MULTI VITAMIN & MINERAL PO) Take by mouth       No current facility-administered medications for this visit. No Known Allergies    Health Maintenance   Topic Date Due    Hepatitis C screen  1983    HIV screen  05/15/1998    Varicella vaccine (1 of 2 - 2-dose childhood series) 11/04/2013    Potassium monitoring  02/18/2022    Creatinine monitoring  02/18/2022    DTaP/Tdap/Td vaccine (3 - Td) 10/28/2028    Flu vaccine  Completed    Hepatitis A vaccine  Aged Out    Hepatitis B vaccine  Aged Out    Hib vaccine  Aged Out    Meningococcal (ACWY) vaccine  Aged Out    Pneumococcal 0-64 years Vaccine  Aged Out       Subjective:     Review of Systems   Constitutional: Negative for activity change, appetite change, fatigue and fever. BP (!) 134/94   Pulse 81   Ht 5' 10\" (1.778 m)   Wt 220 lb (99.8 kg)   SpO2 96%   BMI 31.57 kg/m²    Respiratory: Negative for cough and shortness of breath. Cardiovascular: Negative for chest pain. Gastrointestinal: Negative for abdominal pain, constipation, diarrhea, nausea and vomiting. Skin: Negative for color change, pallor, rash and wound. Neurological: Negative for headaches. Psychiatric/Behavioral: The patient is not nervous/anxious. Objective:     Physical Exam  Vitals signs and nursing note reviewed. Constitutional:       General: He is not in acute distress. Appearance: Normal appearance. He is well-developed. He is not ill-appearing. HENT:      Head: Normocephalic and atraumatic. Cardiovascular:      Rate and Rhythm: Normal rate and regular rhythm. Heart sounds: No murmur. Pulmonary:      Effort: Pulmonary effort is normal. No respiratory distress. Breath sounds: Normal breath sounds. No wheezing or rales. Skin:     General: Skin is warm and dry. Coloration: Skin is not pale. Findings: No erythema or rash. Neurological:      Mental Status: He is alert and oriented to person, place, and time. Psychiatric:         Mood and Affect: Mood normal.         Behavior: Behavior normal.         Thought Content: Thought content normal.         Judgment: Judgment normal.       BP (!) 134/94   Pulse 81   Ht 5' 10\" (1.778 m)   Wt 220 lb (99.8 kg)   SpO2 96%   BMI 31.57 kg/m²     Assessment:       Diagnosis Orders   1. Essential hypertension  lisinopril (PRINIVIL;ZESTRIL) 30 MG tablet    Comprehensive Metabolic Panel    Lipid Panel   2. Dyslipidemia  Comprehensive Metabolic Panel    Lipid Panel             Plan:      Return in about 2 weeks (around 3/9/2021) for hypertension. BP improving but still running above goal  Will increase to 30mg daily and recheck in 2 weeks  Cholesterol still above goal but has improved  Discussed low salt and caffeine diet  Continue regular exercise  Reviewed labs and will continue to work on diet exercise to control  Repeat labs in 3 mo.  Discussed possible medication treatment if continues elevated Patient agreed with treatment plan     Lab Results   Component Value Date    CHOL 236 (H) 02/18/2021    CHOL 282 (H) 10/13/2020    CHOL 206 (H) 09/13/2019     Lab Results   Component Value Date    TRIG 302 (H) 02/18/2021    TRIG 508 (H) 10/13/2020    TRIG 194 (H) 09/13/2019     Lab Results   Component Value Date    HDL 39 (L) 02/18/2021    HDL 40 (L) 10/13/2020    HDL 40 (L) 09/13/2019     Lab Results   Component Value Date    LDLCHOLESTEROL 137 (H) 02/18/2021    LDLCHOLESTEROL      10/13/2020    LDLCHOLESTEROL 127 09/13/2019     Lab Results   Component Value Date    VLDL NOT REPORTED (H) 02/18/2021    VLDL NOT REPORTED 10/13/2020    VLDL NOT REPORTED (H) 09/13/2019     Lab Results   Component Value Date    CHOLHDLRATIO 6.1 (H) 02/18/2021    CHOLHDLRATIO 7.1 (H) 10/13/2020    CHOLHDLRATIO 5.2 (H) 09/13/2019     Lab Results   Component Value Date     02/18/2021    K 4.4 02/18/2021     02/18/2021    CO2 24 02/18/2021    BUN 18 02/18/2021    CREATININE 1.03 02/18/2021    GLUCOSE 88 02/18/2021    CALCIUM 9.8 02/18/2021    PROT 7.4 02/18/2021    LABALBU 4.6 02/18/2021    BILITOT 0.92 02/18/2021    ALKPHOS 73 02/18/2021    AST 29 02/18/2021    ALT 35 02/18/2021    LABGLOM >60 02/18/2021    GFRAA >60 02/18/2021      Patient given educational materials - see patient instructions. Discussed use, benefit, and side effects of prescribed medications. All patientquestions answered. Pt voiced understanding. Reviewed health maintenance. Instructedto continue current medications, diet and exercise. Patient agreed with treatmentplan. Follow up as directed.      Electronically signed by Edsel Ganser, PA-C on 2/23/2021 at 1:43 PM

## 2021-03-09 ENCOUNTER — OFFICE VISIT (OUTPATIENT)
Dept: FAMILY MEDICINE CLINIC | Age: 36
End: 2021-03-09
Payer: COMMERCIAL

## 2021-03-09 VITALS
SYSTOLIC BLOOD PRESSURE: 140 MMHG | HEIGHT: 70 IN | HEART RATE: 72 BPM | OXYGEN SATURATION: 99 % | WEIGHT: 220 LBS | DIASTOLIC BLOOD PRESSURE: 70 MMHG | BODY MASS INDEX: 31.5 KG/M2

## 2021-03-09 DIAGNOSIS — I10 ESSENTIAL HYPERTENSION: Primary | ICD-10-CM

## 2021-03-09 PROCEDURE — 99213 OFFICE O/P EST LOW 20 MIN: CPT | Performed by: PHYSICIAN ASSISTANT

## 2021-03-09 ASSESSMENT — ENCOUNTER SYMPTOMS
DIARRHEA: 0
CONSTIPATION: 0
COLOR CHANGE: 0
WHEEZING: 0
NAUSEA: 0
SHORTNESS OF BREATH: 0
BLURRED VISION: 0
VOMITING: 0
ABDOMINAL PAIN: 0
COUGH: 0

## 2021-03-09 NOTE — PROGRESS NOTES
[address] [facility]  [address] [address]  Dept: [phone]  Dept Fax: [phone]    Rachel Conteh is a 38 y.o. male who presents today for his medical conditions/complaintsas noted below. Rachel Conteh is c/o of   Chief Complaint   Patient presents with    Hypertension         HPI:     Hypertension  This is a chronic problem. The current episode started more than 1 year ago. The problem has been gradually improving since onset. The problem is controlled. Pertinent negatives include no anxiety, blurred vision, chest pain, headaches, malaise/fatigue, palpitations, peripheral edema or shortness of breath. Risk factors for coronary artery disease include family history and male gender. Past treatments include ACE inhibitors. The current treatment provides moderate improvement. patient here for recheck on BP  He states doing well. Tolerating medication well. He also updates he just signed up for weight watchers and is starting to see some changes. He is enjoying the different recipes he is trying.      No results found for: LABA1C          ( goal A1Cis < 7)   No results found for: LABMICR  LDL Cholesterol (mg/dL)   Date Value   02/18/2021 137 (H)   10/13/2020        09/13/2019 127       (goal LDL is <100)   AST (U/L)   Date Value   02/18/2021 29     ALT (U/L)   Date Value   02/18/2021 35     BUN (mg/dL)   Date Value   02/18/2021 18     BP Readings from Last 3 Encounters:   03/09/21 (!) 140/70   02/23/21 (!) 134/94   10/21/20 (!) 142/90          (goal 120/80)    Past Medical History:   Diagnosis Date    Anxiety     Bulging lumbar disc     Chronic dryness of both eyes     Hyperlipidemia     Hypertension       Past Surgical History:   Procedure Laterality Date    EYE SURGERY      SEPTOPLASTY      TONSILLECTOMY         Family History   Problem Relation Age of Onset    Diabetes Maternal Grandfather     Other Mother cholelithiasis    Other Father         atrial fib, mitral valve repair    Stroke Father     Other Maternal Grandmother         alzeimers    Cancer Paternal Grandmother         unknown    Other Paternal Aunt          atrial fib       Social History     Tobacco Use    Smoking status: Never Smoker    Smokeless tobacco: Never Used   Substance Use Topics    Alcohol use: No      Current Outpatient Medications   Medication Sig Dispense Refill    lisinopril (PRINIVIL;ZESTRIL) 30 MG tablet Take 1 tablet by mouth daily 90 tablet 1    sertraline (ZOLOFT) 100 MG tablet TAKE ONE AND ONE-HALF TABLET BY MOUTH DAILY 135 tablet 1    meloxicam (MOBIC) 15 MG tablet TAKE ONE TABLET BY MOUTH DAILY 90 tablet 0    Omega-3 Fatty Acids (FISH OIL) 1000 MG CAPS Take 3,000 mg by mouth 3 times daily      Multiple Vitamins-Minerals (MENS MULTI VITAMIN & MINERAL PO) Take by mouth       No current facility-administered medications for this visit. No Known Allergies    Health Maintenance   Topic Date Due    Hepatitis C screen  Never done    HIV screen  Never done    Varicella vaccine (1 of 2 - 2-dose childhood series) Never done    Potassium monitoring  02/18/2022    Creatinine monitoring  02/18/2022    DTaP/Tdap/Td vaccine (3 - Td) 10/28/2028    Flu vaccine  Completed    Hepatitis A vaccine  Aged Out    Hepatitis B vaccine  Aged Out    Hib vaccine  Aged Out    Meningococcal (ACWY) vaccine  Aged Out    Pneumococcal 0-64 years Vaccine  Aged Out       Subjective:     Review of Systems   Constitutional: Negative for activity change, appetite change, fatigue, fever, malaise/fatigue and unexpected weight change. BP (!) 140/70   Pulse 72   Ht 5' 10\" (1.778 m)   Wt 220 lb (99.8 kg)   SpO2 99%   BMI 31.57 kg/m²    Eyes: Negative for blurred vision. Respiratory: Negative for cough, shortness of breath and wheezing. Cardiovascular: Negative for chest pain, palpitations and leg swelling.    Gastrointestinal: Negative for abdominal pain, constipation, diarrhea, nausea and vomiting. Skin: Negative for color change, pallor, rash and wound. Neurological: Negative for weakness and headaches. Psychiatric/Behavioral: The patient is not nervous/anxious. Objective:     Physical Exam  Vitals signs and nursing note reviewed. Constitutional:       Appearance: Normal appearance. He is well-developed. HENT:      Head: Normocephalic and atraumatic. Cardiovascular:      Rate and Rhythm: Normal rate and regular rhythm. Heart sounds: No murmur. Pulmonary:      Effort: Pulmonary effort is normal. No respiratory distress. Breath sounds: Normal breath sounds. No wheezing, rhonchi or rales. Skin:     General: Skin is warm and dry. Coloration: Skin is not pale. Findings: No erythema or rash. Neurological:      Mental Status: He is alert and oriented to person, place, and time. Psychiatric:         Mood and Affect: Mood normal.         Behavior: Behavior normal.         Thought Content: Thought content normal.         Judgment: Judgment normal.       BP (!) 140/70   Pulse 72   Ht 5' 10\" (1.778 m)   Wt 220 lb (99.8 kg)   SpO2 99%   BMI 31.57 kg/m²     Assessment:       Diagnosis Orders   1. Essential hypertension               Plan:      Return in about 4 months (around 7/9/2021) for hypertension, med review. Patient doing well on present dose  BP improving  Cont present medication, weight watchers and regular exercise  Recheck in 4 mo with labs, sooner prn  Patient agreed with treatment plan      Patient given educational materials - see patient instructions. Discussed use, benefit, and side effects of prescribed medications. All patientquestions answered. Pt voiced understanding. Reviewed health maintenance. Instructedto continue current medications, diet and exercise. Patient agreed with treatmentplan. Follow up as directed.      Electronically signed by Andrews Posadas PA-C on 3/9/2021 at 1:19 PM

## 2021-03-09 NOTE — PROGRESS NOTES
Visit Information    Have you changed or started any medications since your last visit including any over-the-counter medicines, vitamins, or herbal medicines? no   Are you having any side effects from any of your medications? -  no  Have you stopped taking any of your medications? Is so, why? -  no    Have you seen any other physician or provider since your last visit? No  Have you had any other diagnostic tests since your last visit? No  Have you been seen in the emergency room and/or had an admission to a hospital since we last saw you? No  Have you had your routine dental cleaning in the past 6 months? no    Have you activated your [email or web address] account? If not, what are your barriers?  No:      Patient Care Team:  Patrice Marin PA-C as PCP - General (Family Medicine)  Patrice Marin PA-C as PCP - [facility] Provider    Medical History Review  Past Medical, Family, and Social History reviewed and does contribute to the patient presenting condition    Health Maintenance   Topic Date Due    Hepatitis C screen  Never done    HIV screen  Never done    Varicella vaccine (1 of 2 - 2-dose childhood series) Never done    Potassium monitoring  02/18/2022    Creatinine monitoring  02/18/2022    DTaP/Tdap/Td vaccine (3 - Td) 10/28/2028    Flu vaccine  Completed    Hepatitis A vaccine  Aged Out    Hepatitis B vaccine  Aged Out    Hib vaccine  Aged Out    Meningococcal (ACWY) vaccine  Aged Out    Pneumococcal 0-64 years Vaccine  Aged Out

## 2021-05-13 DIAGNOSIS — F41.9 ANXIETY: ICD-10-CM

## 2021-05-13 RX ORDER — SERTRALINE HYDROCHLORIDE 100 MG/1
TABLET, FILM COATED ORAL
Qty: 135 TABLET | Refills: 0 | Status: SHIPPED | OUTPATIENT
Start: 2021-05-13 | End: 2021-08-25 | Stop reason: SDUPTHER

## 2021-08-23 ENCOUNTER — HOSPITAL ENCOUNTER (OUTPATIENT)
Age: 36
Setting detail: SPECIMEN
Discharge: HOME | End: 2021-08-23
Payer: COMMERCIAL

## 2021-08-23 DIAGNOSIS — E78.5 DYSLIPIDEMIA: ICD-10-CM

## 2021-08-23 DIAGNOSIS — I10 ESSENTIAL HYPERTENSION: ICD-10-CM

## 2021-08-24 LAB
ALBUMIN SERPL-MCNC: 4.6 G/DL (ref 3.5–5.2)
ALBUMIN/GLOBULIN RATIO: 1.8 (ref 1–2.5)
ALP BLD-CCNC: 72 U/L (ref 40–129)
ALT SERPL-CCNC: 27 U/L (ref 5–41)
ANION GAP SERPL CALCULATED.3IONS-SCNC: 13 MMOL/L (ref 9–17)
AST SERPL-CCNC: 30 U/L
BILIRUB SERPL-MCNC: 0.71 MG/DL (ref 0.3–1.2)
BUN BLDV-MCNC: 16 MG/DL (ref 6–20)
BUN/CREAT BLD: ABNORMAL (ref 9–20)
CALCIUM SERPL-MCNC: 9.4 MG/DL (ref 8.6–10.4)
CHLORIDE BLD-SCNC: 101 MMOL/L (ref 98–107)
CHOLESTEROL/HDL RATIO: 6.6
CHOLESTEROL: 230 MG/DL
CO2: 22 MMOL/L (ref 20–31)
CREAT SERPL-MCNC: 1.24 MG/DL (ref 0.7–1.2)
GFR AFRICAN AMERICAN: >60 ML/MIN
GFR NON-AFRICAN AMERICAN: >60 ML/MIN
GFR SERPL CREATININE-BSD FRML MDRD: ABNORMAL ML/MIN/{1.73_M2}
GFR SERPL CREATININE-BSD FRML MDRD: ABNORMAL ML/MIN/{1.73_M2}
GLUCOSE BLD-MCNC: 96 MG/DL (ref 70–99)
HDLC SERPL-MCNC: 35 MG/DL
LDL CHOLESTEROL: 130 MG/DL (ref 0–130)
POTASSIUM SERPL-SCNC: 4.9 MMOL/L (ref 3.7–5.3)
SODIUM BLD-SCNC: 136 MMOL/L (ref 135–144)
TOTAL PROTEIN: 7.2 G/DL (ref 6.4–8.3)
TRIGL SERPL-MCNC: 326 MG/DL
VLDLC SERPL CALC-MCNC: ABNORMAL MG/DL (ref 1–30)

## 2021-08-25 ENCOUNTER — OFFICE VISIT (OUTPATIENT)
Dept: FAMILY MEDICINE CLINIC | Age: 36
End: 2021-08-25
Payer: COMMERCIAL

## 2021-08-25 VITALS
BODY MASS INDEX: 31.35 KG/M2 | SYSTOLIC BLOOD PRESSURE: 132 MMHG | HEART RATE: 77 BPM | WEIGHT: 219 LBS | HEIGHT: 70 IN | TEMPERATURE: 97 F | OXYGEN SATURATION: 97 % | DIASTOLIC BLOOD PRESSURE: 88 MMHG

## 2021-08-25 DIAGNOSIS — F41.9 ANXIETY: ICD-10-CM

## 2021-08-25 DIAGNOSIS — E78.1 HYPERTRIGLYCERIDEMIA: Primary | ICD-10-CM

## 2021-08-25 DIAGNOSIS — I10 ESSENTIAL HYPERTENSION: ICD-10-CM

## 2021-08-25 PROCEDURE — 99213 OFFICE O/P EST LOW 20 MIN: CPT | Performed by: PHYSICIAN ASSISTANT

## 2021-08-25 RX ORDER — SERTRALINE HYDROCHLORIDE 100 MG/1
TABLET, FILM COATED ORAL
Qty: 135 TABLET | Refills: 0 | Status: SHIPPED | OUTPATIENT
Start: 2021-08-25 | End: 2021-11-28

## 2021-08-25 RX ORDER — AMLODIPINE BESYLATE 5 MG/1
5 TABLET ORAL DAILY
Qty: 30 TABLET | Refills: 3 | Status: SHIPPED | OUTPATIENT
Start: 2021-08-25 | End: 2021-09-16 | Stop reason: SDUPTHER

## 2021-08-25 RX ORDER — LISINOPRIL 30 MG/1
30 TABLET ORAL DAILY
Qty: 90 TABLET | Refills: 1 | Status: SHIPPED | OUTPATIENT
Start: 2021-08-25 | End: 2022-02-21

## 2021-08-25 RX ORDER — FENOFIBRATE 145 MG/1
145 TABLET, COATED ORAL DAILY
Qty: 90 TABLET | Refills: 1 | Status: SHIPPED | OUTPATIENT
Start: 2021-08-25 | End: 2022-02-21

## 2021-08-25 ASSESSMENT — ENCOUNTER SYMPTOMS
NAUSEA: 0
WHEEZING: 0
VOMITING: 0
CONSTIPATION: 0
DIARRHEA: 0
ABDOMINAL PAIN: 0
COLOR CHANGE: 0
SHORTNESS OF BREATH: 0
COUGH: 0

## 2021-08-25 NOTE — PROGRESS NOTES
[address] [address] [facility]  [address]  [address] [facility] [address]  Dept: [phone]  Dept Fax: [phone]    Naty Colbert is a 43 y.o. male who presents today for his medical conditions/complaintsas noted below. Naty Colbert is c/o of   Chief Complaint   Patient presents with    Hypertension         HPI:     Hypertension  This is a chronic problem. The current episode started more than 1 year ago. The problem is unchanged. The problem is controlled. Pertinent negatives include no chest pain, palpitations, peripheral edema or shortness of breath. Risk factors for coronary artery disease include family history and male gender. Past treatments include ACE inhibitors. The current treatment provides moderate improvement. patient does not check his BP at home  He state feeling well. Taking medication daily as ordered.    patient has been using some caffeine but has cut down to 2 cups of coffee a day  He has staying active cleaning his dads house out but has not been exercising regularly  He did recently complete labs to review  Patient states anxiety stable on present dose    No results found for: LABA1C          ( goal A1Cis < 7)   No results found for: LABMICR  LDL Cholesterol (mg/dL)   Date Value   08/23/2021 130   02/18/2021 137 (H)   10/13/2020            (goal LDL is <100)   AST (U/L)   Date Value   08/23/2021 30     ALT (U/L)   Date Value   08/23/2021 27     BUN (mg/dL)   Date Value   08/23/2021 16     BP Readings from Last 3 Encounters:   08/25/21 132/88   03/09/21 (!) 140/70   02/23/21 (!) 134/94          (goal 120/80)    Past Medical History:   Diagnosis Date    Anxiety     Bulging lumbar disc     Chronic dryness of both eyes     Hyperlipidemia     Hypertension       Past Surgical History:   Procedure Laterality Date    EYE SURGERY      SEPTOPLASTY      TONSILLECTOMY         Family History   Problem Relation Age of Onset    Diabetes Maternal Dieudonne Montgomery Other Mother         cholelithiasis    Other Father         atrial fib, mitral valve repair    Stroke Father     Other Maternal Grandmother         alzeimers    Cancer Paternal Grandmother         unknown    Other Paternal Aunt          atrial fib       Social History     Tobacco Use    Smoking status: Never Smoker    Smokeless tobacco: Never Used   Substance Use Topics    Alcohol use: No      Current Outpatient Medications   Medication Sig Dispense Refill    lisinopril (PRINIVIL;ZESTRIL) 30 MG tablet Take 1 tablet by mouth daily 90 tablet 1    sertraline (ZOLOFT) 100 MG tablet TAKE 1 AND 1/2 TABLET BY MOUTH DAILY 135 tablet 0    fenofibrate (TRICOR) 145 MG tablet Take 1 tablet by mouth daily 90 tablet 1    amLODIPine (NORVASC) 5 MG tablet Take 1 tablet by mouth daily 30 tablet 3    Omega-3 Fatty Acids (FISH OIL) 1000 MG CAPS Take 3,000 mg by mouth 3 times daily      Multiple Vitamins-Minerals (MENS MULTI VITAMIN & MINERAL PO) Take by mouth       No current facility-administered medications for this visit. No Known Allergies    Health Maintenance   Topic Date Due    Hepatitis C screen  Never done    COVID-19 Vaccine (1) Never done    HIV screen  Never done    Varicella vaccine (1 of 2 - 2-dose childhood series) Never done    Flu vaccine (1) 09/01/2021    Potassium monitoring  08/23/2022    Creatinine monitoring  08/23/2022    DTaP/Tdap/Td vaccine (3 - Td or Tdap) 10/28/2028    Hepatitis A vaccine  Aged Out    Hepatitis B vaccine  Aged Out    Hib vaccine  Aged Out    Meningococcal (ACWY) vaccine  Aged Out    Pneumococcal 0-64 years Vaccine  Aged Out       Subjective:     Review of Systems   Constitutional: Negative for activity change, appetite change, fatigue, fever and unexpected weight change.         /88 (Site: Left Upper Arm, Position: Sitting, Cuff Size: Large Adult)   Pulse 77   Temp 97 °F (36.1 °C) (Tympanic)   Ht 5' 10\" (1.778 m)   Wt 219 lb (99.3 kg) SpO2 97%   BMI 31.42 kg/m²    Respiratory: Negative for cough, shortness of breath and wheezing. Cardiovascular: Negative for chest pain, palpitations and leg swelling. Gastrointestinal: Negative for abdominal pain, constipation, diarrhea, nausea and vomiting. Skin: Negative for color change, pallor, rash and wound. Neurological: Negative for weakness. Psychiatric/Behavioral: The patient is not nervous/anxious. Objective:     Physical Exam  Vitals and nursing note reviewed. Constitutional:       General: He is not in acute distress. Appearance: Normal appearance. He is well-developed. He is not ill-appearing. HENT:      Head: Normocephalic and atraumatic. Cardiovascular:      Rate and Rhythm: Normal rate and regular rhythm. Heart sounds: No murmur heard. Pulmonary:      Effort: Pulmonary effort is normal. No respiratory distress. Breath sounds: Normal breath sounds. No wheezing, rhonchi or rales. Musculoskeletal:      Right lower leg: No edema. Left lower leg: No edema. Skin:     General: Skin is warm and dry. Coloration: Skin is not pale. Findings: No erythema or rash. Neurological:      Mental Status: He is alert and oriented to person, place, and time. Psychiatric:         Mood and Affect: Mood normal.         Behavior: Behavior normal.         Thought Content: Thought content normal.         Judgment: Judgment normal.       /88 (Site: Left Upper Arm, Position: Sitting, Cuff Size: Large Adult)   Pulse 77   Temp 97 °F (36.1 °C) (Tympanic)   Ht 5' 10\" (1.778 m)   Wt 219 lb (99.3 kg)   SpO2 97%   BMI 31.42 kg/m²     Assessment:       Diagnosis Orders   1. Hypertriglyceridemia  fenofibrate (TRICOR) 145 MG tablet    Comprehensive Metabolic Panel    Lipid Panel   2. Essential hypertension  lisinopril (PRINIVIL;ZESTRIL) 30 MG tablet    amLODIPine (NORVASC) 5 MG tablet   3.  Anxiety  sertraline (ZOLOFT) 100 MG tablet             Plan: Return in about 3 weeks (around 9/15/2021) for hypertension. BP still remains slightly above goal  Discussed addition of norvasc. Patient has used and tolerated this well in the past  Patient has not been able to exercise regularly recently and admits likely will not be able to  His trigs are increasing again. Strong family history of dyslipidemia on moms side  Discussed addition of fenofibrate, use and SE reviewed  Recheck in 3 weeks for BP check  Patient agreed with treatment plan     Lab Results   Component Value Date     08/23/2021    K 4.9 08/23/2021     08/23/2021    CO2 22 08/23/2021    BUN 16 08/23/2021    CREATININE 1.24 (H) 08/23/2021    GLUCOSE 96 08/23/2021    CALCIUM 9.4 08/23/2021    PROT 7.2 08/23/2021    LABALBU 4.6 08/23/2021    BILITOT 0.71 08/23/2021    ALKPHOS 72 08/23/2021    AST 30 08/23/2021    ALT 27 08/23/2021    LABGLOM >60 08/23/2021    GFRAA >60 08/23/2021       Lab Results   Component Value Date    CHOL 230 (H) 08/23/2021    CHOL 236 (H) 02/18/2021    CHOL 282 (H) 10/13/2020     Lab Results   Component Value Date    TRIG 326 (H) 08/23/2021    TRIG 302 (H) 02/18/2021    TRIG 508 (H) 10/13/2020     Lab Results   Component Value Date    HDL 35 (L) 08/23/2021    HDL 39 (L) 02/18/2021    HDL 40 (L) 10/13/2020     Lab Results   Component Value Date    LDLCHOLESTEROL 130 08/23/2021    LDLCHOLESTEROL 137 (H) 02/18/2021    LDLCHOLESTEROL      10/13/2020     Lab Results   Component Value Date    VLDL NOT REPORTED (H) 08/23/2021    VLDL NOT REPORTED (H) 02/18/2021    VLDL NOT REPORTED 10/13/2020     Lab Results   Component Value Date    CHOLHDLRATIO 6.6 (H) 08/23/2021    CHOLHDLRATIO 6.1 (H) 02/18/2021    CHOLHDLRATIO 7.1 (H) 10/13/2020      Patient given educational materials - see patient instructions. Discussed use, benefit, and side effects of prescribed medications. All patientquestions answered. Pt voiced understanding. Reviewed health maintenance.   Instructedto continue current medications, diet and exercise. Patient agreed with treatmentplan. Follow up as directed.      Electronically signed by Ellen Gonzales PA-C on 8/25/2021 at 9:46 AM

## 2021-08-25 NOTE — PROGRESS NOTES
Visit Information    Have you changed or started any medications since your last visit including any over-the-counter medicines, vitamins, or herbal medicines? no   Are you having any side effects from any of your medications? -  no  Have you stopped taking any of your medications? Is so, why? -  no    Have you seen any other physician or provider since your last visit? No  Have you had any other diagnostic tests since your last visit? No  Have you been seen in the emergency room and/or had an admission to a hospital since we last saw you? No  Have you had your routine dental cleaning in the past 6 months? no    Have you activated your [employer] account? If not, what are your barriers?  Yes     Patient Care Team:  Severo Love PA-C as PCP - General (Family Medicine)  Severo Love PA-C as PCP - [facility]    Medical History Review  Past Medical, Family, and Social History reviewed and  contribute to the patient presenting condition    Health Maintenance   Topic Date Due    Hepatitis C screen  Never done    COVID-19 Vaccine (1) Never done    HIV screen  Never done    Varicella vaccine (1 of 2 - 2-dose childhood series) Never done    Flu vaccine (1) 09/01/2021    Potassium monitoring  08/23/2022    Creatinine monitoring  08/23/2022    DTaP/Tdap/Td vaccine (3 - Td or Tdap) 10/28/2028    Hepatitis A vaccine  Aged Out    Hepatitis B vaccine  Aged Out    Hib vaccine  Aged Out    Meningococcal (ACWY) vaccine  Aged Out    Pneumococcal 0-64 years Vaccine  Aged Out

## 2021-09-16 ENCOUNTER — OFFICE VISIT (OUTPATIENT)
Dept: FAMILY MEDICINE CLINIC | Age: 36
End: 2021-09-16
Payer: COMMERCIAL

## 2021-09-16 VITALS
BODY MASS INDEX: 31.21 KG/M2 | HEIGHT: 70 IN | OXYGEN SATURATION: 98 % | DIASTOLIC BLOOD PRESSURE: 84 MMHG | SYSTOLIC BLOOD PRESSURE: 128 MMHG | HEART RATE: 76 BPM | WEIGHT: 218 LBS | TEMPERATURE: 97.6 F

## 2021-09-16 DIAGNOSIS — Z23 NEED FOR INFLUENZA VACCINATION: ICD-10-CM

## 2021-09-16 DIAGNOSIS — I10 ESSENTIAL HYPERTENSION: Primary | ICD-10-CM

## 2021-09-16 PROCEDURE — 99213 OFFICE O/P EST LOW 20 MIN: CPT | Performed by: PHYSICIAN ASSISTANT

## 2021-09-16 PROCEDURE — 90674 CCIIV4 VAC NO PRSV 0.5 ML IM: CPT | Performed by: PHYSICIAN ASSISTANT

## 2021-09-16 PROCEDURE — 90471 IMMUNIZATION ADMIN: CPT | Performed by: PHYSICIAN ASSISTANT

## 2021-09-16 RX ORDER — AMLODIPINE BESYLATE 5 MG/1
5 TABLET ORAL DAILY
Qty: 90 TABLET | Refills: 1 | Status: SHIPPED
Start: 2021-09-16 | End: 2022-02-04 | Stop reason: SINTOL

## 2021-09-16 ASSESSMENT — ENCOUNTER SYMPTOMS
VOMITING: 0
ABDOMINAL PAIN: 0
COLOR CHANGE: 0
SHORTNESS OF BREATH: 0
CONSTIPATION: 0
DIARRHEA: 0
NAUSEA: 0
COUGH: 0

## 2021-09-16 NOTE — PROGRESS NOTES
[facility]  [address] [address]  Dept: [phone]  Dept Fax: [phone]    Everardo Ashton is a 43 y.o. male who presents today for his medical conditions/complaintsas noted below. Everardo Ashton is c/o of   Chief Complaint   Patient presents with    Hypertension         HPI:     Hypertension  This is a chronic problem. The current episode started more than 1 year ago. The problem has been gradually improving since onset. The problem is controlled. Pertinent negatives include no chest pain, palpitations, peripheral edema or shortness of breath. Risk factors for coronary artery disease include family history, male gender, sedentary lifestyle and obesity. Past treatments include ACE inhibitors and calcium channel blockers. The current treatment provides moderate improvement. Compliance problems include exercise. patient here for recheck after addition of norvasc for BP. He has not been checking BP at home but states he is feeling better. No current concerns.  tolerating medication well    No results found for: LABA1C          ( goal A1Cis < 7)   No results found for: LABMICR  LDL Cholesterol (mg/dL)   Date Value   08/23/2021 130   02/18/2021 137 (H)   10/13/2020            (goal LDL is <100)   AST (U/L)   Date Value   08/23/2021 30     ALT (U/L)   Date Value   08/23/2021 27     BUN (mg/dL)   Date Value   08/23/2021 16     BP Readings from Last 3 Encounters:   09/16/21 128/84   08/25/21 132/88   03/09/21 (!) 140/70          (goal 120/80)    Past Medical History:   Diagnosis Date    Anxiety     Bulging lumbar disc     Chronic dryness of both eyes     Hyperlipidemia     Hypertension       Past Surgical History:   Procedure Laterality Date    EYE SURGERY      SEPTOPLASTY      TONSILLECTOMY         Family History   Problem Relation Age of Onset    Diabetes Maternal Grandfather     Other Mother         cholelithiasis    Other Father         atrial fib, mitral valve repair    Stroke Father     Other Maternal Grandmother         alzeimers    Cancer Paternal Grandmother         unknown    Other Paternal Aunt          atrial fib       Social History     Tobacco Use    Smoking status: Never Smoker    Smokeless tobacco: Never Used   Substance Use Topics    Alcohol use: No      Current Outpatient Medications   Medication Sig Dispense Refill    amLODIPine (NORVASC) 5 MG tablet Take 1 tablet by mouth daily 90 tablet 1    lisinopril (PRINIVIL;ZESTRIL) 30 MG tablet Take 1 tablet by mouth daily 90 tablet 1    sertraline (ZOLOFT) 100 MG tablet TAKE 1 AND 1/2 TABLET BY MOUTH DAILY 135 tablet 0    fenofibrate (TRICOR) 145 MG tablet Take 1 tablet by mouth daily 90 tablet 1    Omega-3 Fatty Acids (FISH OIL) 1000 MG CAPS Take 3,000 mg by mouth 3 times daily      Multiple Vitamins-Minerals (MENS MULTI VITAMIN & MINERAL PO) Take by mouth       No current facility-administered medications for this visit. No Known Allergies    Health Maintenance   Topic Date Due    Hepatitis C screen  Never done    COVID-19 Vaccine (1) Never done    HIV screen  Never done    Varicella vaccine (1 of 2 - 2-dose childhood series) Never done    Potassium monitoring  08/23/2022    Creatinine monitoring  08/23/2022    DTaP/Tdap/Td vaccine (3 - Td or Tdap) 10/28/2028    Flu vaccine  Completed    Hepatitis A vaccine  Aged Out    Hepatitis B vaccine  Aged Out    Hib vaccine  Aged Out    Meningococcal (ACWY) vaccine  Aged Out    Pneumococcal 0-64 years Vaccine  Aged Out       Subjective:     Review of Systems   Constitutional: Negative for activity change, appetite change, fatigue, fever and unexpected weight change.         /84 (Site: Left Upper Arm, Position: Sitting, Cuff Size: Large Adult)   Pulse 76   Temp 97.6 °F (36.4 °C) (Tympanic)   Ht 5' 10\" (1.778 m)   Wt 218 lb (98.9 kg)   SpO2 98%   BMI 31.28 kg/m²    Respiratory: Negative for cough and shortness of breath. Cardiovascular: Negative for chest pain, palpitations and leg swelling. Gastrointestinal: Negative for abdominal pain, constipation, diarrhea, nausea and vomiting. Skin: Negative for color change, pallor, rash and wound. Neurological: Negative for weakness. Psychiatric/Behavioral: The patient is not nervous/anxious. Objective:     Physical Exam  Vitals and nursing note reviewed. Constitutional:       General: He is not in acute distress. Appearance: Normal appearance. He is well-developed. He is not ill-appearing. HENT:      Head: Normocephalic and atraumatic. Cardiovascular:      Rate and Rhythm: Normal rate and regular rhythm. Heart sounds: No murmur heard. Pulmonary:      Effort: Pulmonary effort is normal. No respiratory distress. Breath sounds: Normal breath sounds. No wheezing, rhonchi or rales. Musculoskeletal:      Right lower leg: No edema. Left lower leg: No edema. Skin:     General: Skin is warm and dry. Coloration: Skin is not pale. Findings: No erythema or rash. Neurological:      Mental Status: He is alert and oriented to person, place, and time. Psychiatric:         Mood and Affect: Mood normal.         Behavior: Behavior normal.         Thought Content: Thought content normal.         Judgment: Judgment normal.       /84 (Site: Left Upper Arm, Position: Sitting, Cuff Size: Large Adult)   Pulse 76   Temp 97.6 °F (36.4 °C) (Tympanic)   Ht 5' 10\" (1.778 m)   Wt 218 lb (98.9 kg)   SpO2 98%   BMI 31.28 kg/m²     Assessment:       Diagnosis Orders   1. Essential hypertension  amLODIPine (NORVASC) 5 MG tablet   2. Need for influenza vaccination  INFLUENZA, MDCK QUADV, 2 YRS AND OLDER, IM, PF, PREFILL SYR OR SDV, 0.5ML (FLUCELVAX QUADV, PF)             Plan:      Return in about 4 months (around 1/16/2022) for hypertension, lab review, HLD.     BP improved on present dose  Cont same dose and update refill for 90 day supply  Patient has order to recheck lipids. Will follow up after the holidays  Encouraged healthy diet and regular exercise  Patient agreed with treatment plan  Health Maintenance reviewed - Flu shot given. Orders Placed This Encounter   Medications    amLODIPine (NORVASC) 5 MG tablet     Sig: Take 1 tablet by mouth daily     Dispense:  90 tablet     Refill:  1       Patient given educational materials - see patient instructions. Discussed use, benefit, and side effects of prescribed medications. All patientquestions answered. Pt voiced understanding. Reviewed health maintenance. Instructedto continue current medications, diet and exercise. Patient agreed with treatmentplan. Follow up as directed.      Electronically signed by Cheo Powell PA-C on 9/16/2021 at 9:20 AM

## 2021-09-16 NOTE — PROGRESS NOTES
Visit Information    Have you changed or started any medications since your last visit including any over-the-counter medicines, vitamins, or herbal medicines? no   Are you having any side effects from any of your medications? -  no  Have you stopped taking any of your medications? Is so, why? -  no    Have you seen any other physician or provider since your last visit? No  Have you had any other diagnostic tests since your last visit? No  Have you been seen in the emergency room and/or had an admission to a hospital since we last saw you? No  Have you had your routine dental cleaning in the past 6 months? no    Have you activated your [employer] account? If not, what are your barriers? Yes     Patient Care Team:  Mark Drake PA-C as PCP - General (Family Medicine)  Mark Drake PA-C as PCP - [facility]    Medical History Review  Past Medical, Family, and Social History reviewed and  contribute to the patient presenting condition    Health Maintenance   Topic Date Due    Hepatitis C screen  Never done    COVID-19 Vaccine (1) Never done    HIV screen  Never done    Varicella vaccine (1 of 2 - 2-dose childhood series) Never done    Flu vaccine (1) 09/01/2021    Potassium monitoring  08/23/2022    Creatinine monitoring  08/23/2022    DTaP/Tdap/Td vaccine (3 - Td or Tdap) 10/28/2028    Hepatitis A vaccine  Aged Out    Hepatitis B vaccine  Aged Out    Hib vaccine  Aged Out    Meningococcal (ACWY) vaccine  Aged Out    Pneumococcal 0-64 years Vaccine  Aged [employer], \"Influenza - Inactivated\"  given to Dorothea Walker, or parent/legal guardian of  Dorothea Walker and verbalized understanding. Patient responses:    Have you ever had a reaction to a flu vaccine? No  Are you able to eat eggs without adverse effects? Yes  Do you have any current illness? No  Have you ever had Guillian Elmo Syndrome? No    Flu vaccine given per order. Please see immunization tab.   After obtaining consent, and per orders of Perry Pulido PA-C, injection of Flu injection given in Right deltoid by Gildardo Stahl MA. Patient instructed to remain in clinic for 20 minutes afterwards, and to report any adverse reaction to me immediately.

## 2021-11-27 DIAGNOSIS — F41.9 ANXIETY: ICD-10-CM

## 2021-11-28 RX ORDER — SERTRALINE HYDROCHLORIDE 100 MG/1
TABLET, FILM COATED ORAL
Qty: 135 TABLET | Refills: 0 | Status: SHIPPED | OUTPATIENT
Start: 2021-11-28 | End: 2022-02-21

## 2022-01-20 ENCOUNTER — HOSPITAL ENCOUNTER (OUTPATIENT)
Age: 37
Setting detail: SPECIMEN
Discharge: HOME | End: 2022-01-20

## 2022-01-20 DIAGNOSIS — E78.1 HYPERTRIGLYCERIDEMIA: ICD-10-CM

## 2022-01-20 LAB
ALBUMIN SERPL-MCNC: 4.5 G/DL (ref 3.5–5.2)
ALBUMIN/GLOBULIN RATIO: 1.7 (ref 1–2.5)
ALP BLD-CCNC: 55 U/L (ref 40–129)
ALT SERPL-CCNC: 38 U/L (ref 5–41)
ANION GAP SERPL CALCULATED.3IONS-SCNC: 14 MMOL/L (ref 9–17)
AST SERPL-CCNC: 26 U/L
BILIRUB SERPL-MCNC: 0.4 MG/DL (ref 0.3–1.2)
BUN BLDV-MCNC: 25 MG/DL (ref 6–20)
BUN/CREAT BLD: ABNORMAL (ref 9–20)
CALCIUM SERPL-MCNC: 9.4 MG/DL (ref 8.6–10.4)
CHLORIDE BLD-SCNC: 104 MMOL/L (ref 98–107)
CHOLESTEROL/HDL RATIO: 5.6
CHOLESTEROL: 235 MG/DL
CO2: 23 MMOL/L (ref 20–31)
CREAT SERPL-MCNC: 1.4 MG/DL (ref 0.7–1.2)
GFR AFRICAN AMERICAN: >60 ML/MIN
GFR NON-AFRICAN AMERICAN: 57 ML/MIN
GFR SERPL CREATININE-BSD FRML MDRD: ABNORMAL ML/MIN/{1.73_M2}
GFR SERPL CREATININE-BSD FRML MDRD: ABNORMAL ML/MIN/{1.73_M2}
GLUCOSE BLD-MCNC: 87 MG/DL (ref 70–99)
HDLC SERPL-MCNC: 42 MG/DL
LDL CHOLESTEROL: 154 MG/DL (ref 0–130)
POTASSIUM SERPL-SCNC: 4.7 MMOL/L (ref 3.7–5.3)
SODIUM BLD-SCNC: 141 MMOL/L (ref 135–144)
TOTAL PROTEIN: 7.2 G/DL (ref 6.4–8.3)
TRIGL SERPL-MCNC: 194 MG/DL
VLDLC SERPL CALC-MCNC: ABNORMAL MG/DL (ref 1–30)

## 2022-01-27 ENCOUNTER — OFFICE VISIT (OUTPATIENT)
Dept: FAMILY MEDICINE CLINIC | Age: 37
End: 2022-01-27
Payer: COMMERCIAL

## 2022-01-27 VITALS
BODY MASS INDEX: 33.5 KG/M2 | HEART RATE: 68 BPM | DIASTOLIC BLOOD PRESSURE: 84 MMHG | HEIGHT: 70 IN | SYSTOLIC BLOOD PRESSURE: 130 MMHG | WEIGHT: 234 LBS | OXYGEN SATURATION: 98 %

## 2022-01-27 DIAGNOSIS — H04.123 DRY EYE SYNDROME OF BOTH EYES: ICD-10-CM

## 2022-01-27 DIAGNOSIS — S80.01XA CONTUSION OF RIGHT KNEE, INITIAL ENCOUNTER: ICD-10-CM

## 2022-01-27 DIAGNOSIS — M25.461 EFFUSION OF RIGHT KNEE: ICD-10-CM

## 2022-01-27 DIAGNOSIS — E78.5 DYSLIPIDEMIA: ICD-10-CM

## 2022-01-27 DIAGNOSIS — I10 PRIMARY HYPERTENSION: Primary | ICD-10-CM

## 2022-01-27 PROCEDURE — 99214 OFFICE O/P EST MOD 30 MIN: CPT | Performed by: PHYSICIAN ASSISTANT

## 2022-01-27 ASSESSMENT — ENCOUNTER SYMPTOMS
COLOR CHANGE: 0
VOMITING: 0
DIARRHEA: 0
CONSTIPATION: 0
COUGH: 0
EYE REDNESS: 0
NAUSEA: 0
EYE PAIN: 0
WHEEZING: 0
ABDOMINAL PAIN: 0
SHORTNESS OF BREATH: 0

## 2022-01-27 NOTE — PROGRESS NOTES
[facility]  [address] [address]  Dept: [phone]  Dept Fax: [phone]    Jaime Daniels is a 43 y.o. male who presents today for his medical conditions/complaintsas noted below. Jaime Daniels is c/o of   Chief Complaint   Patient presents with    Hypertension    Hyperlipidemia    Discuss Labs    Knee Pain     fell on Saturday on ice- right side         HPI:     Hypertension  This is a chronic problem. The current episode started more than 1 year ago. The problem is unchanged. The problem is controlled. Pertinent negatives include no chest pain, palpitations, peripheral edema or shortness of breath. Risk factors for coronary artery disease include family history, male gender and obesity. Past treatments include calcium channel blockers and ACE inhibitors. The current treatment provides moderate improvement. Hyperlipidemia  This is a chronic problem. The current episode started more than 1 year ago. Pertinent negatives include no chest pain, myalgias or shortness of breath. Current antihyperlipidemic treatment includes fibric acid derivatives. The current treatment provides moderate improvement of lipids. Knee Pain   The incident occurred 5 to 7 days ago. Incident location: fell while snowboarding last weekend. The injury mechanism was a fall. The pain is present in the right knee. The pain is mild. The pain has been fluctuating since onset. Pertinent negatives include no numbness. Exacerbated by: stairs have been hard. He has tried NSAIDs, rest and immobilization for the symptoms. The treatment provided moderate relief. patient also reporting that since he started using amlodipine he has noticed worsening dry eye symptoms. He states no pain or redness. Has history of dry eye and is on lubricating ointment from his eye doctor.  Also running humidifier in the bedroom presently    No results found for: LABA1C          ( goal A1Cis < 7)   No results found for: LABMICR  LDL Cholesterol (mg/dL)   Date Value   01/20/2022 154 (H)   08/23/2021 130   02/18/2021 137 (H)       (goal LDL is <100)   AST (U/L)   Date Value   01/20/2022 26     ALT (U/L)   Date Value   01/20/2022 38     BUN (mg/dL)   Date Value   01/20/2022 25 (H)     BP Readings from Last 3 Encounters:   01/27/22 130/84   09/16/21 128/84   08/25/21 132/88          (goal 120/80)    Past Medical History:   Diagnosis Date    Anxiety     Bulging lumbar disc     Chronic dryness of both eyes     Hyperlipidemia     Hypertension       Past Surgical History:   Procedure Laterality Date    EYE SURGERY      SEPTOPLASTY      TONSILLECTOMY         Family History   Problem Relation Age of Onset    Diabetes Maternal Grandfather     Other Mother         cholelithiasis    Other Father         atrial fib, mitral valve repair    Stroke Father     Other Maternal Grandmother         alzeimers    Cancer Paternal Grandmother         unknown    Other Paternal Aunt          atrial fib       Social History     Tobacco Use    Smoking status: Never Smoker    Smokeless tobacco: Never Used   Substance Use Topics    Alcohol use: No      Current Outpatient Medications   Medication Sig Dispense Refill    sertraline (ZOLOFT) 100 MG tablet TAKE 1 AND 1/2 TABLET BY MOUTH DAILY 135 tablet 0    amLODIPine (NORVASC) 5 MG tablet Take 1 tablet by mouth daily 90 tablet 1    lisinopril (PRINIVIL;ZESTRIL) 30 MG tablet Take 1 tablet by mouth daily 90 tablet 1    fenofibrate (TRICOR) 145 MG tablet Take 1 tablet by mouth daily 90 tablet 1    Omega-3 Fatty Acids (FISH OIL) 1000 MG CAPS Take 3,000 mg by mouth 3 times daily      Multiple Vitamins-Minerals (MENS MULTI VITAMIN & MINERAL PO) Take by mouth       No current facility-administered medications for this visit.      No Known Allergies    Health Maintenance   Topic Date Due    Hepatitis C screen  Never done    COVID-19 Vaccine (1) Never done    HIV screen  Never done    Varicella vaccine (1 of 2 - 2-dose childhood series) Never done    Depression Screen  02/23/2022    Potassium monitoring  01/20/2023    Creatinine monitoring  01/20/2023    DTaP/Tdap/Td vaccine (3 - Td or Tdap) 10/28/2028    Flu vaccine  Completed    Hepatitis A vaccine  Aged Out    Hepatitis B vaccine  Aged Out    Hib vaccine  Aged Out    Meningococcal (ACWY) vaccine  Aged Out    Pneumococcal 0-64 years Vaccine  Aged Out       Subjective:     Review of Systems   Constitutional: Negative for activity change, appetite change, fatigue, fever and unexpected weight change. /84 (Site: Left Upper Arm, Position: Sitting, Cuff Size: Large Adult)   Pulse 68   Ht 5' 10\" (1.778 m)   Wt 234 lb (106.1 kg)   SpO2 98%   BMI 33.58 kg/m²    Eyes: Negative for pain, redness and visual disturbance. Dry eye   Respiratory: Negative for cough, shortness of breath and wheezing. Cardiovascular: Negative for chest pain, palpitations and leg swelling. Gastrointestinal: Negative for abdominal pain, constipation, diarrhea, nausea and vomiting. Musculoskeletal: Positive for arthralgias. Negative for myalgias. Skin: Negative for color change, pallor, rash and wound. Neurological: Negative for weakness and numbness. Hematological: Negative for adenopathy. Psychiatric/Behavioral: The patient is not nervous/anxious. Objective:     Physical Exam  Vitals and nursing note reviewed. Constitutional:       Appearance: Normal appearance. He is well-developed. HENT:      Head: Normocephalic and atraumatic. Eyes:      General:         Right eye: No discharge. Left eye: No discharge. Extraocular Movements: Extraocular movements intact. Pupils: Pupils are equal, round, and reactive to light. Cardiovascular:      Rate and Rhythm: Normal rate and regular rhythm. Heart sounds: No murmur heard.       Pulmonary:      Effort: Pulmonary effort is normal. No respiratory distress. Breath sounds: Normal breath sounds. No wheezing, rhonchi or rales. Musculoskeletal:      Right knee: Swelling, effusion and ecchymosis present. No deformity, erythema, lacerations, bony tenderness or crepitus. Normal range of motion. Tenderness present over the medial joint line. No lateral joint line, MCL or patellar tendon tenderness. No LCL laxity, MCL laxity, ACL laxity or PCL laxity. Normal alignment, normal meniscus and normal patellar mobility. Legs:       Comments: Ecchymosis mid thigh. No open areas or weeping. Anterior knee shows swelling and a medial abrasion. No weeping no signs of infection. Skin:     General: Skin is warm and dry. Coloration: Skin is not pale. Findings: No erythema or rash. Neurological:      Mental Status: He is alert and oriented to person, place, and time. Psychiatric:         Mood and Affect: Mood normal.         Behavior: Behavior normal.         Thought Content: Thought content normal.         Judgment: Judgment normal.       /84 (Site: Left Upper Arm, Position: Sitting, Cuff Size: Large Adult)   Pulse 68   Ht 5' 10\" (1.778 m)   Wt 234 lb (106.1 kg)   SpO2 98%   BMI 33.58 kg/m²     Assessment:       Diagnosis Orders   1. Primary hypertension  Comprehensive Metabolic Panel    Lipid Panel    CBC Auto Differential    TSH with Reflex   2. Contusion of right knee, initial encounter     3. Effusion of right knee     4. Dyslipidemia  Comprehensive Metabolic Panel    Lipid Panel    CBC Auto Differential    TSH with Reflex   5. Dry eye syndrome of both eyes               Plan:      Return in about 6 months (around 7/27/2022) for hypertension, HLD, med review. Patients blood pressure is stable today. He would like to try off the amlodipine for the next week and see if any change in his dry eye. I encouraged him to avoid caffeine and excess salt in his diet during that week to help keep his blood pressure lower.   He BILITOT 0.40 01/20/2022    ALKPHOS 55 01/20/2022    AST 26 01/20/2022    ALT 38 01/20/2022    LABGLOM 57 (L) 01/20/2022    GFRAA >60 01/20/2022       Lab Results   Component Value Date    CHOL 235 (H) 01/20/2022    CHOL 230 (H) 08/23/2021    CHOL 236 (H) 02/18/2021     Lab Results   Component Value Date    TRIG 194 (H) 01/20/2022    TRIG 326 (H) 08/23/2021    TRIG 302 (H) 02/18/2021     Lab Results   Component Value Date    HDL 42 01/20/2022    HDL 35 (L) 08/23/2021    HDL 39 (L) 02/18/2021     Lab Results   Component Value Date    LDLCHOLESTEROL 154 (H) 01/20/2022    LDLCHOLESTEROL 130 08/23/2021    LDLCHOLESTEROL 137 (H) 02/18/2021     Lab Results   Component Value Date    VLDL NOT REPORTED (H) 01/20/2022    VLDL NOT REPORTED (H) 08/23/2021    VLDL NOT REPORTED (H) 02/18/2021     Lab Results   Component Value Date    CHOLHDLRATIO 5.6 (H) 01/20/2022    CHOLHDLRATIO 6.6 (H) 08/23/2021    CHOLHDLRATIO 6.1 (H) 02/18/2021      Patient given educational materials - see patient instructions. Discussed use, benefit, and side effects of prescribed medications. All patientquestions answered. Pt voiced understanding. Reviewed health maintenance. Instructedto continue current medications, diet and exercise. Patient agreed with treatmentplan. Follow up as directed.      Electronically signed by Nahum Mcwilliams PA-C on 1/27/2022 at 9:43 AM

## 2022-02-03 ENCOUNTER — PATIENT MESSAGE (OUTPATIENT)
Dept: FAMILY MEDICINE CLINIC | Age: 37
End: 2022-02-03

## 2022-02-04 RX ORDER — HYDROCHLOROTHIAZIDE 25 MG/1
25 TABLET ORAL EVERY MORNING
Qty: 30 TABLET | Refills: 5 | Status: SHIPPED | OUTPATIENT
Start: 2022-02-04 | End: 2022-08-07

## 2022-02-04 NOTE — TELEPHONE ENCOUNTER
From: Byron Phillips  To: Tracygustabo Cottrell  Sent: 2/3/2022 3:54 PM EST  Subject: Sans Amlodipine trial w.r.t. sinus/eye dryness    Sinuses/Eyes:   Noticeable improvement after 1 day to bleeding sinuses & painful dry eye, painful dry eye gone after day 2, sinuses continue to improve    BP numbers after ~1 week sans amlodipine  2/2: 140/88 (67)  2/3: 130/94 (71)  - Wrist monitor, arm cuff stopped working  - Caffeine decreased from coffee 2x1cup/day to 2x0.5cup/day    Overall:  Noticed feeling a big groggy in the mornings, avg sleep duration decreased 20-30 mins and measured sleep quality decreased ([facility] sleep tracker). Grogginess improves through the morning but does not fully go away. Suspect grogginess related to elevated BP, resumed amlodipine today. Would like to try a different med, but will remain on amlodipine until instructed. Thank you!

## 2022-02-08 ENCOUNTER — OFFICE VISIT (OUTPATIENT)
Dept: FAMILY MEDICINE CLINIC | Age: 37
End: 2022-02-08
Payer: COMMERCIAL

## 2022-02-08 VITALS
HEIGHT: 70 IN | WEIGHT: 232 LBS | HEART RATE: 71 BPM | OXYGEN SATURATION: 98 % | DIASTOLIC BLOOD PRESSURE: 84 MMHG | SYSTOLIC BLOOD PRESSURE: 130 MMHG | BODY MASS INDEX: 33.21 KG/M2

## 2022-02-08 DIAGNOSIS — I10 PRIMARY HYPERTENSION: ICD-10-CM

## 2022-02-08 DIAGNOSIS — S80.01XA CONTUSION OF RIGHT KNEE, INITIAL ENCOUNTER: Primary | ICD-10-CM

## 2022-02-08 PROCEDURE — 99213 OFFICE O/P EST LOW 20 MIN: CPT | Performed by: PHYSICIAN ASSISTANT

## 2022-02-08 ASSESSMENT — ENCOUNTER SYMPTOMS
SHORTNESS OF BREATH: 0
COUGH: 0
COLOR CHANGE: 0
WHEEZING: 0

## 2022-02-08 NOTE — PROGRESS NOTES
[facility]  [address] [address]  Dept: [phone]  Dept Fax: [phone]    Awilda Arita is a 43 y.o. male who presents today for his medical conditions/complaintsas noted below. Awilda Arita is c/o of   Chief Complaint   Patient presents with    Joint Swelling     right knee  swelling has gone down but still present    Hypertension         HPI:     HPI    Patient here for recheck on his right knee pain. He has been using ice elevation, compression and anti-inflammatories. He has seen a great amount of reduction in the amount of swelling he had prior. He has gone snowboarding once since the trauma and states he tolerated it well. He does not feel he is able to bend it completely normally but is seeing improvement. He is interested in starting physical therapy at this time    Patient also reporting doing well on the blood pressure medication. We recently started hydrochlorothiazide and he has had no side effects.   Blood pressure is stable today at 130/84  He reports the dry eye is much better    No results found for: LABA1C          ( goal A1Cis < 7)   No results found for: LABMICR  LDL Cholesterol (mg/dL)   Date Value   01/20/2022 154 (H)   08/23/2021 130   02/18/2021 137 (H)       (goal LDL is <100)   AST (U/L)   Date Value   01/20/2022 26     ALT (U/L)   Date Value   01/20/2022 38     BUN (mg/dL)   Date Value   01/20/2022 25 (H)     BP Readings from Last 3 Encounters:   02/08/22 130/84   01/27/22 130/84   09/16/21 128/84          (goal 120/80)    Past Medical History:   Diagnosis Date    Anxiety     Bulging lumbar disc     Chronic dryness of both eyes     Hyperlipidemia     Hypertension       Past Surgical History:   Procedure Laterality Date    EYE SURGERY      SEPTOPLASTY      TONSILLECTOMY         Family History   Problem Relation Age of Onset    Diabetes Maternal Grandfather     Other Mother cholelithiasis    Other Father         atrial fib, mitral valve repair    Stroke Father     Other Maternal Grandmother         alzeimers    Cancer Paternal Grandmother         unknown    Other Paternal Aunt          atrial fib       Social History     Tobacco Use    Smoking status: Never Smoker    Smokeless tobacco: Never Used   Substance Use Topics    Alcohol use: No      Current Outpatient Medications   Medication Sig Dispense Refill    hydroCHLOROthiazide (HYDRODIURIL) 25 MG tablet Take 1 tablet by mouth every morning 30 tablet 5    sertraline (ZOLOFT) 100 MG tablet TAKE 1 AND 1/2 TABLET BY MOUTH DAILY 135 tablet 0    lisinopril (PRINIVIL;ZESTRIL) 30 MG tablet Take 1 tablet by mouth daily 90 tablet 1    fenofibrate (TRICOR) 145 MG tablet Take 1 tablet by mouth daily 90 tablet 1    Omega-3 Fatty Acids (FISH OIL) 1000 MG CAPS Take 3,000 mg by mouth 3 times daily      Multiple Vitamins-Minerals (MENS MULTI VITAMIN & MINERAL PO) Take by mouth       No current facility-administered medications for this visit. No Known Allergies    Health Maintenance   Topic Date Due    Hepatitis C screen  Never done    COVID-19 Vaccine (1) Never done    HIV screen  Never done    Varicella vaccine (1 of 2 - 2-dose childhood series) Never done    Depression Screen  02/23/2022    Potassium monitoring  01/20/2023    Creatinine monitoring  01/20/2023    DTaP/Tdap/Td vaccine (3 - Td or Tdap) 10/28/2028    Flu vaccine  Completed    Hepatitis A vaccine  Aged Out    Hepatitis B vaccine  Aged Out    Hib vaccine  Aged Out    Meningococcal (ACWY) vaccine  Aged Out    Pneumococcal 0-64 years Vaccine  Aged Out       Subjective:     Review of Systems   Constitutional: Negative for activity change, appetite change, fatigue, fever and unexpected weight change.         /84 (Site: Left Upper Arm, Position: Sitting, Cuff Size: Large Adult)   Pulse 71   Ht 5' 10\" (1.778 m)   Wt 232 lb (105.2 kg)   SpO2 98%   BMI 33.29 kg/m²    Respiratory: Negative for cough, shortness of breath and wheezing. Cardiovascular: Negative for chest pain. Musculoskeletal: Positive for arthralgias. Skin: Negative for color change, pallor, rash and wound. Hematological: Negative for adenopathy. Psychiatric/Behavioral: The patient is not nervous/anxious. Objective:     Physical Exam  Vitals and nursing note reviewed. Constitutional:       General: He is not in acute distress. Appearance: Normal appearance. He is well-developed. He is not ill-appearing. HENT:      Head: Normocephalic and atraumatic. Cardiovascular:      Rate and Rhythm: Normal rate and regular rhythm. Heart sounds: No murmur heard. Pulmonary:      Effort: Pulmonary effort is normal. No respiratory distress. Breath sounds: Normal breath sounds. No wheezing, rhonchi or rales. Musculoskeletal:      Right knee: Swelling (much improved but still mild amount present) and ecchymosis (healing) present. No deformity or erythema. Normal range of motion. Skin:     General: Skin is warm and dry. Coloration: Skin is not pale. Findings: No erythema or rash. Neurological:      Mental Status: He is alert. Psychiatric:         Behavior: Behavior normal.         Thought Content: Thought content normal.         Judgment: Judgment normal.       /84 (Site: Left Upper Arm, Position: Sitting, Cuff Size: Large Adult)   Pulse 71   Ht 5' 10\" (1.778 m)   Wt 232 lb (105.2 kg)   SpO2 98%   BMI 33.29 kg/m²     Assessment:       Diagnosis Orders   1. Contusion of right knee, initial encounter  Ambulatory referral to Physical Therapy   2. Primary hypertension               Plan:      Return in about 6 months (around 8/8/2022) for hypertension. Referral for physical therapy given for right knee pain. I encouraged him to continue with anti-inflammatory, ice, elevation and compression as well.   I advised him to cautious with any activities as well as things like snowboarding as he could fall and injure this again. His blood pressure is doing well today. He has been feeling better off the amlodipine. We will continue the lisinopril and hydrochlorothiazide at this time and plan recheck in 6 months. I encouraged patient to contact me sooner if he has any other questions or concerns  Patient agreed with treatment plan    Orders Placed This Encounter   Procedures    Ambulatory referral to Physical Therapy     Referral Priority:   Routine     Referral Type:   Physical Medicine     Referral Reason:   Specialty Services Required     Requested Specialty:   Physical Therapy     Number of Visits Requested:   1         Patient given educational materials - see patient instructions. Discussed use, benefit, and side effects of prescribed medications. All patientquestions answered. Pt voiced understanding. Reviewed health maintenance. Instructedto continue current medications, diet and exercise. Patient agreed with treatmentplan. Follow up as directed.      Electronically signed by Shira Estrada PA-C on 2/8/2022 at 11:51 AM

## 2022-02-08 NOTE — PROGRESS NOTES
Visit Information    Have you changed or started any medications since your last visit including any over-the-counter medicines, vitamins, or herbal medicines? no   Are you having any side effects from any of your medications? -  no  Have you stopped taking any of your medications? Is so, why? -  no    Have you seen any other physician or provider since your last visit? No  Have you had any other diagnostic tests since your last visit? No  Have you been seen in the emergency room and/or had an admission to a hospital since we last saw you? No  Have you had your routine dental cleaning in the past 6 months? no    Have you activated your [facility] account? If not, what are your barriers?  Yes     Patient Care Team:  Bob Angulo PA-C as PCP - General (Family Medicine)  Bob Angulo PA-C as PCP - [facility]    Medical History Review  Past Medical, Family, and Social History reviewed and  contribute to the patient presenting condition    Health Maintenance   Topic Date Due    Hepatitis C screen  Never done    COVID-19 Vaccine (1) Never done    HIV screen  Never done    Varicella vaccine (1 of 2 - 2-dose childhood series) Never done    Depression Screen  02/23/2022    Potassium monitoring  01/20/2023    Creatinine monitoring  01/20/2023    DTaP/Tdap/Td vaccine (3 - Td or Tdap) 10/28/2028    Flu vaccine  Completed    Hepatitis A vaccine  Aged Out    Hepatitis B vaccine  Aged Out    Hib vaccine  Aged Out    Meningococcal (ACWY) vaccine  Aged Out    Pneumococcal 0-64 years Vaccine  Aged Out

## 2022-02-21 DIAGNOSIS — F41.9 ANXIETY: ICD-10-CM

## 2022-02-21 DIAGNOSIS — I10 ESSENTIAL HYPERTENSION: ICD-10-CM

## 2022-02-21 DIAGNOSIS — E78.1 HYPERTRIGLYCERIDEMIA: ICD-10-CM

## 2022-02-21 RX ORDER — FENOFIBRATE 145 MG/1
TABLET, COATED ORAL
Qty: 90 TABLET | Refills: 1 | Status: SHIPPED | OUTPATIENT
Start: 2022-02-21 | End: 2022-07-18

## 2022-02-21 RX ORDER — LISINOPRIL 30 MG/1
TABLET ORAL
Qty: 90 TABLET | Refills: 1 | Status: SHIPPED | OUTPATIENT
Start: 2022-02-21 | End: 2022-07-18

## 2022-02-21 RX ORDER — SERTRALINE HYDROCHLORIDE 100 MG/1
TABLET, FILM COATED ORAL
Qty: 135 TABLET | Refills: 0 | Status: SHIPPED | OUTPATIENT
Start: 2022-02-21 | End: 2022-07-18

## 2022-07-18 DIAGNOSIS — E78.1 HYPERTRIGLYCERIDEMIA: ICD-10-CM

## 2022-07-18 DIAGNOSIS — I10 ESSENTIAL HYPERTENSION: ICD-10-CM

## 2022-07-18 DIAGNOSIS — F41.9 ANXIETY: ICD-10-CM

## 2022-07-18 RX ORDER — FENOFIBRATE 145 MG/1
TABLET, COATED ORAL
Qty: 90 TABLET | Refills: 1 | Status: SHIPPED
Start: 2022-07-18 | End: 2022-08-25 | Stop reason: DRUGHIGH

## 2022-07-18 RX ORDER — LISINOPRIL 30 MG/1
TABLET ORAL
Qty: 90 TABLET | Refills: 1 | Status: SHIPPED | OUTPATIENT
Start: 2022-07-18

## 2022-07-18 RX ORDER — SERTRALINE HYDROCHLORIDE 100 MG/1
TABLET, FILM COATED ORAL
Qty: 135 TABLET | Refills: 0 | Status: SHIPPED | OUTPATIENT
Start: 2022-07-18

## 2022-08-07 RX ORDER — HYDROCHLOROTHIAZIDE 25 MG/1
TABLET ORAL
Qty: 30 TABLET | Refills: 5 | Status: SHIPPED | OUTPATIENT
Start: 2022-08-07 | End: 2022-08-25 | Stop reason: SDUPTHER

## 2022-08-24 ENCOUNTER — HOSPITAL ENCOUNTER (OUTPATIENT)
Age: 37
Setting detail: SPECIMEN
Discharge: HOME | End: 2022-08-24

## 2022-08-24 DIAGNOSIS — I10 PRIMARY HYPERTENSION: ICD-10-CM

## 2022-08-24 DIAGNOSIS — E78.5 DYSLIPIDEMIA: ICD-10-CM

## 2022-08-24 LAB
ABSOLUTE EOS #: 0.1 K/UL (ref 0–0.44)
ABSOLUTE IMMATURE GRANULOCYTE: 0.05 K/UL (ref 0–0.3)
ABSOLUTE LYMPH #: 2.68 K/UL (ref 1.1–3.7)
ABSOLUTE MONO #: 0.63 K/UL (ref 0.1–1.2)
ALBUMIN SERPL-MCNC: 4.9 G/DL (ref 3.5–5.2)
ALBUMIN/GLOBULIN RATIO: 1.9 (ref 1–2.5)
ALP BLD-CCNC: 45 U/L (ref 40–129)
ALT SERPL-CCNC: 42 U/L (ref 5–41)
ANION GAP SERPL CALCULATED.3IONS-SCNC: 11 MMOL/L (ref 9–17)
AST SERPL-CCNC: 36 U/L
BASOPHILS # BLD: 0 % (ref 0–2)
BASOPHILS ABSOLUTE: 0.03 K/UL (ref 0–0.2)
BILIRUB SERPL-MCNC: 0.53 MG/DL (ref 0.3–1.2)
BUN BLDV-MCNC: 17 MG/DL (ref 6–20)
CALCIUM SERPL-MCNC: 9.8 MG/DL (ref 8.6–10.4)
CHLORIDE BLD-SCNC: 100 MMOL/L (ref 98–107)
CHOLESTEROL/HDL RATIO: 7.2
CHOLESTEROL: 201 MG/DL
CO2: 26 MMOL/L (ref 20–31)
CREAT SERPL-MCNC: 1.37 MG/DL (ref 0.7–1.2)
EOSINOPHILS RELATIVE PERCENT: 2 % (ref 1–4)
GFR AFRICAN AMERICAN: >60 ML/MIN
GFR NON-AFRICAN AMERICAN: 58 ML/MIN
GFR SERPL CREATININE-BSD FRML MDRD: ABNORMAL ML/MIN/{1.73_M2}
GLUCOSE BLD-MCNC: 91 MG/DL (ref 70–99)
HCT VFR BLD CALC: 45 % (ref 40.7–50.3)
HDLC SERPL-MCNC: 28 MG/DL
HEMOGLOBIN: 15.2 G/DL (ref 13–17)
IMMATURE GRANULOCYTES: 1 %
LDL CHOLESTEROL: 113 MG/DL (ref 0–130)
LYMPHOCYTES # BLD: 40 % (ref 24–43)
MCH RBC QN AUTO: 30.2 PG (ref 25.2–33.5)
MCHC RBC AUTO-ENTMCNC: 33.8 G/DL (ref 28.4–34.8)
MCV RBC AUTO: 89.5 FL (ref 82.6–102.9)
MONOCYTES # BLD: 9 % (ref 3–12)
NRBC AUTOMATED: 0 PER 100 WBC
PDW BLD-RTO: 11.7 % (ref 11.8–14.4)
PLATELET # BLD: 259 K/UL (ref 138–453)
PMV BLD AUTO: 10.9 FL (ref 8.1–13.5)
POTASSIUM SERPL-SCNC: 4.5 MMOL/L (ref 3.7–5.3)
RBC # BLD: 5.03 M/UL (ref 4.21–5.77)
SEG NEUTROPHILS: 48 % (ref 36–65)
SEGMENTED NEUTROPHILS ABSOLUTE COUNT: 3.26 K/UL (ref 1.5–8.1)
SODIUM BLD-SCNC: 137 MMOL/L (ref 135–144)
TOTAL PROTEIN: 7.5 G/DL (ref 6.4–8.3)
TRIGL SERPL-MCNC: 298 MG/DL
TSH SERPL DL<=0.05 MIU/L-ACNC: 2.46 UIU/ML (ref 0.3–5)
WBC # BLD: 6.8 K/UL (ref 3.5–11.3)

## 2022-08-25 ENCOUNTER — OFFICE VISIT (OUTPATIENT)
Dept: FAMILY MEDICINE CLINIC | Age: 37
End: 2022-08-25
Payer: COMMERCIAL

## 2022-08-25 VITALS
WEIGHT: 231 LBS | DIASTOLIC BLOOD PRESSURE: 82 MMHG | OXYGEN SATURATION: 97 % | SYSTOLIC BLOOD PRESSURE: 122 MMHG | BODY MASS INDEX: 33.07 KG/M2 | HEART RATE: 61 BPM | HEIGHT: 70 IN | TEMPERATURE: 97.1 F

## 2022-08-25 DIAGNOSIS — E78.5 DYSLIPIDEMIA: ICD-10-CM

## 2022-08-25 DIAGNOSIS — E78.1 HYPERTRIGLYCERIDEMIA: ICD-10-CM

## 2022-08-25 DIAGNOSIS — I10 PRIMARY HYPERTENSION: Primary | ICD-10-CM

## 2022-08-25 PROCEDURE — 99213 OFFICE O/P EST LOW 20 MIN: CPT | Performed by: PHYSICIAN ASSISTANT

## 2022-08-25 RX ORDER — FENOFIBRATE 160 MG/1
160 TABLET ORAL DAILY
Qty: 90 TABLET | Refills: 1 | Status: SHIPPED | OUTPATIENT
Start: 2022-08-25

## 2022-08-25 RX ORDER — HYDROCHLOROTHIAZIDE 25 MG/1
TABLET ORAL
Qty: 90 TABLET | Refills: 3 | Status: SHIPPED | OUTPATIENT
Start: 2022-08-25

## 2022-08-25 ASSESSMENT — ENCOUNTER SYMPTOMS
WHEEZING: 0
DIARRHEA: 0
SHORTNESS OF BREATH: 0
VOMITING: 0
ABDOMINAL PAIN: 0
NAUSEA: 0
COUGH: 0
COLOR CHANGE: 0
CONSTIPATION: 0

## 2022-08-25 ASSESSMENT — PATIENT HEALTH QUESTIONNAIRE - PHQ9
SUM OF ALL RESPONSES TO PHQ9 QUESTIONS 1 & 2: 0
SUM OF ALL RESPONSES TO PHQ QUESTIONS 1-9: 0
2. FEELING DOWN, DEPRESSED OR HOPELESS: 0
SUM OF ALL RESPONSES TO PHQ QUESTIONS 1-9: 0
1. LITTLE INTEREST OR PLEASURE IN DOING THINGS: 0

## 2022-08-25 NOTE — PROGRESS NOTES
Visit Information    Have you changed or started any medications since your last visit including any over-the-counter medicines, vitamins, or herbal medicines? no   Are you having any side effects from any of your medications? -  no  Have you stopped taking any of your medications? Is so, why? -  no    Have you seen any other physician or provider since your last visit? No  Have you had any other diagnostic tests since your last visit? No  Have you been seen in the emergency room and/or had an admission to a hospital since we last saw you? No  Have you had your routine dental cleaning in the past 6 months? no    Have you activated your [facility] account? If not, what are your barriers?  Yes     Patient Care Team:  Joe Griffith PA-C as PCP - General (Family Medicine)  Joe Griffith PA-C as PCP - [facility]    Medical History Review  Past Medical, Family, and Social History reviewed and  contribute to the patient presenting condition    Health Maintenance   Topic Date Due    COVID-19 Vaccine (1) Never done    HIV screen  Never done    Hepatitis C screen  Never done    Varicella vaccine (1 of 2 - 2-dose childhood series) Never done    Depression Screen  02/23/2022    Flu vaccine (1) 09/01/2022    DTaP/Tdap/Td vaccine (3 - Td or Tdap) 10/28/2028    Hepatitis A vaccine  Aged Out    Hepatitis B vaccine  Aged Out    Hib vaccine  Aged Out    Meningococcal (ACWY) vaccine  Aged Out    Pneumococcal 0-64 years Vaccine  Aged Out

## 2022-08-25 NOTE — PROGRESS NOTES
[id number] [facility]  [address] [address]  Dept: [phone]  Dept Fax: [phone]    Lo Luu is a 42 y.o. male who presents today for his medical conditions/complaintsas noted below. Lo Luu is c/o of   Chief Complaint   Patient presents with    Hypertension         HPI:     Hypertension  This is a chronic problem. The current episode started more than 1 year ago. The problem is unchanged. The problem is controlled. Pertinent negatives include no chest pain, palpitations, peripheral edema or shortness of breath. Risk factors for coronary artery disease include family history and male gender. Past treatments include ACE inhibitors and diuretics. The current treatment provides moderate improvement. There are no compliance problems (trying to get more regular exercise. cooking at home more and trying to eat healthy).       No results found for: LABA1C          ( goal A1Cis < 7)   No results found for: LABMICR  LDL Cholesterol (mg/dL)   Date Value   08/24/2022 113   01/20/2022 154 (H)   08/23/2021 130       (goal LDL is <100)   AST (U/L)   Date Value   08/24/2022 36     ALT (U/L)   Date Value   08/24/2022 42 (H)     BUN (mg/dL)   Date Value   08/24/2022 17     BP Readings from Last 3 Encounters:   08/25/22 122/82   02/08/22 130/84   01/27/22 130/84          (goal 120/80)    Past Medical History:   Diagnosis Date    Anxiety     Bulging lumbar disc     Chronic dryness of both eyes     Hyperlipidemia     Hypertension       Past Surgical History:   Procedure Laterality Date    EYE SURGERY      SEPTOPLASTY      TONSILLECTOMY         Family History   Problem Relation Age of Onset    Diabetes Maternal Grandfather     Other Mother         cholelithiasis    Other Father         atrial fib, mitral valve repair    Stroke Father     Other Maternal Grandmother         alzeimers    Cancer Paternal Grandmother         unknown    Other Paternal [de-identified] atrial fib       Social History     Tobacco Use    Smoking status: Never    Smokeless tobacco: Never   Substance Use Topics    Alcohol use: No      Current Outpatient Medications   Medication Sig Dispense Refill    hydroCHLOROthiazide (HYDRODIURIL) 25 MG tablet TAKE ONE TABLET BY MOUTH EVERY MORNING 90 tablet 3    fenofibrate (TRIGLIDE) 160 MG tablet Take 1 tablet by mouth daily 90 tablet 1    lisinopril (PRINIVIL;ZESTRIL) 30 MG tablet TAKE ONE TABLET BY MOUTH DAILY 90 tablet 1    sertraline (ZOLOFT) 100 MG tablet TAKE 1 AND 1/2 TABLET BY MOUTH DAILY 135 tablet 0    Omega-3 Fatty Acids (FISH OIL) 1000 MG CAPS Take 3,000 mg by mouth 3 times daily      Multiple Vitamins-Minerals (MENS MULTI VITAMIN & MINERAL PO) Take by mouth       No current facility-administered medications for this visit. No Known Allergies    Health Maintenance   Topic Date Due    COVID-19 Vaccine (1) Never done    HIV screen  Never done    Hepatitis C screen  Never done    Varicella vaccine (1 of 2 - 2-dose childhood series) Never done    Depression Screen  02/23/2022    Flu vaccine (1) 09/01/2022    DTaP/Tdap/Td vaccine (3 - Td or Tdap) 10/28/2028    Hepatitis A vaccine  Aged Out    Hepatitis B vaccine  Aged Out    Hib vaccine  Aged Out    Meningococcal (ACWY) vaccine  Aged Out    Pneumococcal 0-64 years Vaccine  Aged Out       Subjective:     Review of Systems   Constitutional:  Negative for activity change, appetite change, fatigue, fever and unexpected weight change. /82 (Site: Left Upper Arm, Position: Sitting, Cuff Size: Large Adult)   Pulse 61   Temp 97.1 °F (36.2 °C) (Tympanic)   Ht 5' 10\" (1.778 m)   Wt 231 lb (104.8 kg)   SpO2 97%   BMI 33.15 kg/m²    Respiratory:  Negative for cough, shortness of breath and wheezing. Cardiovascular:  Negative for chest pain, palpitations and leg swelling. Gastrointestinal:  Negative for abdominal pain, constipation, diarrhea, nausea and vomiting.    Skin:  Negative for color change, pallor, rash and wound. Neurological:  Negative for weakness. Psychiatric/Behavioral:  The patient is not nervous/anxious. Objective:     Physical Exam  Vitals and nursing note reviewed. Constitutional:       General: He is not in acute distress. Appearance: Normal appearance. He is well-developed. He is not ill-appearing. HENT:      Head: Normocephalic and atraumatic. Cardiovascular:      Rate and Rhythm: Normal rate and regular rhythm. Heart sounds: No murmur heard. Pulmonary:      Effort: Pulmonary effort is normal. No respiratory distress. Breath sounds: Normal breath sounds. No wheezing, rhonchi or rales. Musculoskeletal:      Right lower leg: No edema. Left lower leg: No edema. Skin:     General: Skin is warm and dry. Coloration: Skin is not pale. Findings: No erythema or rash. Neurological:      Mental Status: He is alert and oriented to person, place, and time. Psychiatric:         Mood and Affect: Mood normal.         Behavior: Behavior normal.         Thought Content: Thought content normal.         Judgment: Judgment normal.     /82 (Site: Left Upper Arm, Position: Sitting, Cuff Size: Large Adult)   Pulse 61   Temp 97.1 °F (36.2 °C) (Tympanic)   Ht 5' 10\" (1.778 m)   Wt 231 lb (104.8 kg)   SpO2 97%   BMI 33.15 kg/m²     Assessment:       Diagnosis Orders   1. Primary hypertension  hydroCHLOROthiazide (HYDRODIURIL) 25 MG tablet      2. Hypertriglyceridemia  fenofibrate (TRIGLIDE) 160 MG tablet      3. Dyslipidemia                  Plan:      Return in about 6 months (around 2/25/2023) for med review, hypertension, HLD. Patient's blood pressure is well controlled. Continue present medications. Refill for 90-day supply. We did review his most recent labs. Triglycerides have started to increase again we will increase fenofibrate to 160 mg once daily. New Rx to the pharmacy. Continue healthy diet and regular exercise. Again we reviewed low-cholesterol diet. Patient agreed with treatment plan    Orders Placed This Encounter   Medications    hydroCHLOROthiazide (HYDRODIURIL) 25 MG tablet     Sig: TAKE ONE TABLET BY MOUTH EVERY MORNING     Dispense:  90 tablet     Refill:  3    fenofibrate (TRIGLIDE) 160 MG tablet     Sig: Take 1 tablet by mouth daily     Dispense:  90 tablet     Refill:  1      Lab Results   Component Value Date    CHOL 201 (H) 08/24/2022    CHOL 235 (H) 01/20/2022    CHOL 230 (H) 08/23/2021     Lab Results   Component Value Date    TRIG 298 (H) 08/24/2022    TRIG 194 (H) 01/20/2022    TRIG 326 (H) 08/23/2021     Lab Results   Component Value Date    HDL 28 (L) 08/24/2022    HDL 42 01/20/2022    HDL 35 (L) 08/23/2021     Lab Results   Component Value Date    LDLCHOLESTEROL 113 08/24/2022    LDLCHOLESTEROL 154 (H) 01/20/2022    LDLCHOLESTEROL 130 08/23/2021     Lab Results   Component Value Date    VLDL NOT REPORTED (H) 01/20/2022    VLDL NOT REPORTED (H) 08/23/2021    VLDL NOT REPORTED (H) 02/18/2021     Lab Results   Component Value Date    CHOLHDLRATIO 7.2 (H) 08/24/2022    CHOLHDLRATIO 5.6 (H) 01/20/2022    CHOLHDLRATIO 6.6 (H) 08/23/2021     Lab Results   Component Value Date     08/24/2022    K 4.5 08/24/2022     08/24/2022    CO2 26 08/24/2022    BUN 17 08/24/2022    CREATININE 1.37 (H) 08/24/2022    GLUCOSE 91 08/24/2022    CALCIUM 9.8 08/24/2022    PROT 7.5 08/24/2022    LABALBU 4.9 08/24/2022    BILITOT 0.53 08/24/2022    ALKPHOS 45 08/24/2022    AST 36 08/24/2022    ALT 42 (H) 08/24/2022    LABGLOM 58 (L) 08/24/2022    GFRAA >60 08/24/2022       Lab Results   Component Value Date    WBC 6.8 08/24/2022    HGB 15.2 08/24/2022    HCT 45.0 08/24/2022    MCV 89.5 08/24/2022     08/24/2022     Lab Results   Component Value Date    TSH 2.46 08/24/2022        Patient given educational materials - see patient instructions. Discussed use, benefit, and side effects of prescribed medications.   All

## 2022-09-05 ENCOUNTER — PATIENT MESSAGE (OUTPATIENT)
Dept: FAMILY MEDICINE CLINIC | Age: 37
End: 2022-09-05

## 2022-09-06 RX ORDER — OMEGA-3-ACID ETHYL ESTERS 1 G/1
2 CAPSULE, LIQUID FILLED ORAL 2 TIMES DAILY
Qty: 60 CAPSULE | Refills: 3 | Status: SHIPPED | OUTPATIENT
Start: 2022-09-06

## 2022-09-06 NOTE — TELEPHONE ENCOUNTER
From: José Connell  To: Buck Carter  Sent: 9/5/2022 3:45 PM EDT  Subject: Found covered prescription omega 3 supplement    Marco Flores,  [facility] have a generic of Lovaza for about the same cost as the OTC supplements. Marcos Hung been taking the [employer] strength (1.2g/day), similar price but unregulated. I know we just bumped fenofibrate from 145mg to 160mg, but would this also make sense to do?      Thanks,  Jamee Castleman

## 2022-11-05 DIAGNOSIS — F41.9 ANXIETY: ICD-10-CM

## 2022-11-06 RX ORDER — SERTRALINE HYDROCHLORIDE 100 MG/1
TABLET, FILM COATED ORAL
Qty: 135 TABLET | Refills: 0 | Status: SHIPPED | OUTPATIENT
Start: 2022-11-06

## 2022-11-16 RX ORDER — OMEGA-3-ACID ETHYL ESTERS 1 G/1
CAPSULE, LIQUID FILLED ORAL
Qty: 60 CAPSULE | Refills: 3 | Status: SHIPPED | OUTPATIENT
Start: 2022-11-16

## 2023-01-02 DIAGNOSIS — I10 ESSENTIAL HYPERTENSION: ICD-10-CM

## 2023-01-03 RX ORDER — LISINOPRIL 30 MG/1
TABLET ORAL
Qty: 90 TABLET | Refills: 1 | Status: SHIPPED | OUTPATIENT
Start: 2023-01-03

## 2023-02-05 DIAGNOSIS — F41.9 ANXIETY: ICD-10-CM

## 2023-02-05 RX ORDER — SERTRALINE HYDROCHLORIDE 100 MG/1
TABLET, FILM COATED ORAL
Qty: 135 TABLET | Refills: 0 | Status: SHIPPED | OUTPATIENT
Start: 2023-02-05

## 2023-02-06 DIAGNOSIS — E78.1 HYPERTRIGLYCERIDEMIA: ICD-10-CM

## 2023-02-07 RX ORDER — FENOFIBRATE 160 MG/1
TABLET ORAL
Qty: 90 TABLET | Refills: 1 | Status: SHIPPED | OUTPATIENT
Start: 2023-02-07

## 2023-02-07 NOTE — TELEPHONE ENCOUNTER
Orlin Capellan is calling to request a refill on the following medication(s):    Last Visit Date (If Applicable):  5/54/5892    Next Visit Date:    3/2/2023    Medication Request:  Requested Prescriptions     Pending Prescriptions Disp Refills    fenofibrate (TRIGLIDE) 160 MG tablet [Pharmacy Med Name: FENOFIBRATE 160 MG TABLET] 90 tablet 1     Sig: TAKE ONE TABLET BY MOUTH DAILY

## 2023-02-20 ENCOUNTER — OFFICE VISIT (OUTPATIENT)
Dept: PRIMARY CARE CLINIC | Age: 38
End: 2023-02-20
Payer: COMMERCIAL

## 2023-02-20 VITALS
OXYGEN SATURATION: 97 % | TEMPERATURE: 98.7 F | WEIGHT: 231 LBS | SYSTOLIC BLOOD PRESSURE: 147 MMHG | HEART RATE: 90 BPM | DIASTOLIC BLOOD PRESSURE: 95 MMHG | BODY MASS INDEX: 33.15 KG/M2

## 2023-02-20 DIAGNOSIS — M25.572 ACUTE LEFT ANKLE PAIN: ICD-10-CM

## 2023-02-20 DIAGNOSIS — S99.912A INJURY OF LEFT ANKLE, INITIAL ENCOUNTER: ICD-10-CM

## 2023-02-20 DIAGNOSIS — S82.832A CLOSED FRACTURE OF DISTAL END OF LEFT FIBULA, UNSPECIFIED FRACTURE MORPHOLOGY, INITIAL ENCOUNTER: Primary | ICD-10-CM

## 2023-02-20 PROCEDURE — 99213 OFFICE O/P EST LOW 20 MIN: CPT

## 2023-02-20 PROCEDURE — 3080F DIAST BP >= 90 MM HG: CPT

## 2023-02-20 PROCEDURE — 3077F SYST BP >= 140 MM HG: CPT

## 2023-02-20 RX ORDER — OMEGA-3-ACID ETHYL ESTERS 1 G/1
CAPSULE, LIQUID FILLED ORAL
Qty: 60 CAPSULE | Refills: 3 | Status: SHIPPED | OUTPATIENT
Start: 2023-02-20

## 2023-02-20 RX ORDER — TRAMADOL HYDROCHLORIDE 50 MG/1
50 TABLET ORAL EVERY 4 HOURS PRN
Qty: 18 TABLET | Refills: 0 | Status: SHIPPED | OUTPATIENT
Start: 2023-02-20 | End: 2023-02-23

## 2023-02-20 RX ORDER — IBUPROFEN 200 MG
200 TABLET ORAL EVERY 6 HOURS PRN
COMMUNITY

## 2023-02-20 ASSESSMENT — ENCOUNTER SYMPTOMS
EYE PAIN: 0
COLOR CHANGE: 0
CHEST TIGHTNESS: 0
EYE REDNESS: 0
SHORTNESS OF BREATH: 0
BACK PAIN: 0

## 2023-02-20 NOTE — PROGRESS NOTES
[address] [facility]  [address] [place] [address]  Dept: [phone]  Dept Fax: [phone]    Jonah Vann is a 42 y.o. male who presents to the urgent care today for his medical conditions/complaints as notedbelow. Jonah Vann is c/o of Ankle Injury (Patient got hit bu a snow boarding accident heard ankle pop)      HPI:     Patient presents to the walk in clinic for evaluation of an ankle injury. He was snowboarding and felt a pop in his ankle. Ankle Pain   The incident occurred 12 to 24 hours ago (yesterday around 530 pm). Incident location: snowboarding. The injury mechanism was a twisting injury (in snowboarding). The pain is present in the left ankle. The quality of the pain is described as stabbing. Pain scale: 2-3 on ibuprofen, max 6-7 if moving wrong. The pain is moderate. Associated symptoms include muscle weakness. Pertinent negatives include no inability to bear weight, loss of motion, loss of sensation, numbness or tingling. He reports no foreign bodies present. The symptoms are aggravated by movement and weight bearing. He has tried NSAIDs and ice for the symptoms. The treatment provided moderate relief. Past Medical History:   Diagnosis Date    Anxiety     Bulging lumbar disc     Chronic dryness of both eyes     Hyperlipidemia     Hypertension         Current Outpatient Medications   Medication Sig Dispense Refill    ibuprofen (ADVIL;MOTRIN) 200 MG tablet Take 200 mg by mouth every 6 hours as needed for Pain      traMADol (ULTRAM) 50 MG tablet Take 1 tablet by mouth every 4 hours as needed for Pain for up to 3 days. Intended supply: 5 days.  Take lowest dose possible to manage pain Max Daily Amount: 300 mg 18 tablet 0    fenofibrate (TRIGLIDE) 160 MG tablet TAKE ONE TABLET BY MOUTH DAILY 90 tablet 1    sertraline (ZOLOFT) 100 MG tablet TAKE 1 AND 1/2 TABLET BY MOUTH DAILY 135 tablet 0    lisinopril (PRINIVIL;ZESTRIL) 30 MG tablet TAKE ONE TABLET BY MOUTH DAILY 90 tablet 1    omega-3 acid ethyl esters (LOVAZA) 1 g capsule TAKE TWO CAPSULES BY MOUTH TWICE A DAY 60 capsule 3    hydroCHLOROthiazide (HYDRODIURIL) 25 MG tablet TAKE ONE TABLET BY MOUTH EVERY MORNING 90 tablet 3    Omega-3 Fatty Acids (FISH OIL) 1000 MG CAPS Take 3,000 mg by mouth 3 times daily      Multiple Vitamins-Minerals (MENS MULTI VITAMIN & MINERAL PO) Take by mouth       No current facility-administered medications for this visit. No Known Allergies    Subjective:      Review of Systems   Constitutional:  Negative for fatigue and fever. Eyes:  Negative for pain and redness. Respiratory:  Negative for chest tightness and shortness of breath. Cardiovascular:  Negative for palpitations and leg swelling. Musculoskeletal:  Positive for arthralgias, gait problem and joint swelling. Negative for back pain, myalgias, neck pain and neck stiffness. Skin:  Negative for color change, pallor, rash and wound. Neurological:  Negative for dizziness, tingling and numbness. All other systems reviewed and are negative. 14 systems reviewed and negative except as listed in HPI. Objective:     Physical Exam  Vitals reviewed. Constitutional:       General: He is not in acute distress. Appearance: Normal appearance. He is obese. He is not ill-appearing, toxic-appearing or diaphoretic. HENT:      Head: Normocephalic and atraumatic. Right Ear: External ear normal.      Left Ear: External ear normal.      Nose: Nose normal. No congestion or rhinorrhea. Eyes:      General: No scleral icterus. Right eye: No discharge. Left eye: No discharge. Conjunctiva/sclera: Conjunctivae normal.      Pupils: Pupils are equal, round, and reactive to light. Cardiovascular:      Rate and Rhythm: Normal rate and regular rhythm. Pulses:           Dorsalis pedis pulses are 2+ on the right side and 2+ on the left side. Posterior tibial pulses are 2+ on the right side and 2+ on the left side. Heart sounds: Normal heart sounds. Pulmonary:      Effort: Pulmonary effort is normal.      Breath sounds: Normal breath sounds. Musculoskeletal:         General: Swelling, tenderness and signs of injury present. No deformity. Cervical back: Normal range of motion and neck supple. No rigidity or tenderness. Right lower leg: Normal. No tenderness. No edema. Left lower leg: Normal. No tenderness. No edema. Right ankle: Normal.      Right Achilles Tendon: Normal. No tenderness or defects. Herrmann's test negative. Left ankle: Swelling present. No ecchymosis or lacerations. Tenderness present over the lateral malleolus, ATF ligament and AITF ligament. No proximal fibula tenderness. Decreased range of motion. Anterior drawer test negative. Normal pulse. Left Achilles Tendon: Normal. No tenderness or defects. Herrmann's test negative. Right foot: Normal.      Left foot: Normal range of motion and normal capillary refill. Swelling present. No prominent metatarsal heads, laceration, tenderness, bony tenderness or crepitus. Normal pulse. Feet:      Right foot:      Skin integrity: Skin integrity normal. No skin breakdown, erythema, warmth or dry skin. Left foot:      Skin integrity: Skin integrity normal. No skin breakdown, erythema, warmth or dry skin. Lymphadenopathy:      Cervical: No cervical adenopathy. Skin:     General: Skin is warm and dry. Capillary Refill: Capillary refill takes less than 2 seconds. Findings: No erythema or rash. Neurological:      Mental Status: He is alert and oriented to person, place, and time. Motor: No weakness. Coordination: Coordination normal.      Gait: Gait abnormal.   Psychiatric:         Mood and Affect: Mood normal.         Behavior: Behavior normal.         Thought Content:  Thought content normal.         Judgment: Judgment normal. BP (!) 147/95   Pulse 90   Temp 98.7 °F (37.1 °C)   Wt 231 lb (104.8 kg)   SpO2 97%   BMI 33.15 kg/m²     Assessment:       Diagnosis Orders   1. Closed fracture of distal end of left fibula, unspecified fracture morphology, initial encounter  Jordan Fox MD, Orthopedic Surgery (foot & ankle), [facility] [employer]TH[employer] SU[employer]PLIES Walker Boot, Air Select Low Top, Left; XL (M13+/F15+); Walker Boot, Air Select Low Top, Left    traMADol (ULTRAM) 50 MG tablet      2. Injury of left ankle, initial encounter  XR ANKLE LEFT (MIN 3 VIEWS)    Jordan Fox MD, Orthopedic Surgery (foot & ankle), [facility] [employer]TH[employer] SU[employer]PLIES Walker Boot, Air Select Low Top, Left; XL (M13+/F15+); Walker Boot, Air Select Low Top, Left    traMADol (ULTRAM) 50 MG tablet      3. Acute left ankle pain  XR ANKLE LEFT (MIN 3 VIEWS)    Jordan Fox MD, Orthopedic Surgery (foot & ankle), [facility] ORTH[employer] SUPPLIES Walker Boot, Air Select Low Top, Left; XL (M13+/F15+); Walker Boot, Air Select Low Top, Left    traMADol (ULTRAM) 50 MG tablet        Xray Result (most recent):  XR ANKLE LEFT (MIN 3 VIEWS) 02/20/2023    Narrative  EXAMINATION:  THREE XRAY VIEWS OF THE LEFT ANKLE    2/20/2023 9:54 am    COMPARISON:  None. HISTORY:  ORDERING SYSTEM PROVIDED HISTORY: Injury of left ankle, initial encounter  TECHNOLOGIST PROVIDED HISTORY:  snowboarding accident, twisting injury  Reason for Exam: lt ankle injury    25-year-old male with left ankle twisting injury after snowboarding accident    FINDINGS:  Ankle mortise is intact. Osseous alignment is normal.  Joint spaces are well  maintained. Oblique slightly displaced fracture through the distal fibular  diaphysis and metaphysis. Ankle mortise appears intact. Remaining  visualized osseous structures appear intact. Moderate soft tissue swelling of the lateral and anterior ankle. Large  tibiotalar joint effusion. Boehler's angle is maintained. Impression  1. Acute obliquely oriented slightly displaced fracture through the distal  fibular diaphysis and metaphysis. 2. Moderate soft tissue swelling of the lateral and anterior ankle. Large  tibiotalar joint effusion. Plan:   -Xray reviewed with patient, distal fibula Fx  -Ortho consult, appointment tomorrow  -Rest, Ice and elevation recommended at this time  -Walking boot applied in office  -Tramadol & ibuprofen as needed for pain    Return if symptoms worsen or fail to improve. Orders Placed This Encounter   Medications    traMADol (ULTRAM) 50 MG tablet     Sig: Take 1 tablet by mouth every 4 hours as needed for Pain for up to 3 days. Intended supply: 5 days. Take lowest dose possible to manage pain Max Daily Amount: 300 mg     Dispense:  18 tablet     Refill:  0     Reduce doses taken as pain becomes manageable           Patient given educational materials - see patient instructions. Discussed use, benefit, and side effects of prescribed medications. All patient questions answered. Pt voiced understanding.     Electronically signed by FRANCI Alonso NP on 2/20/2023 at 10:53 AM

## 2023-02-21 ENCOUNTER — HOSPITAL ENCOUNTER (OUTPATIENT)
Dept: CT IMAGING | Age: 38
Discharge: HOME | End: 2023-02-23
Payer: COMMERCIAL

## 2023-02-21 ENCOUNTER — OFFICE VISIT (OUTPATIENT)
Dept: ORTHOPEDIC SURGERY | Age: 38
End: 2023-02-21
Payer: COMMERCIAL

## 2023-02-21 VITALS — WEIGHT: 231 LBS | RESPIRATION RATE: 16 BRPM | BODY MASS INDEX: 33.07 KG/M2 | OXYGEN SATURATION: 100 % | HEIGHT: 70 IN

## 2023-02-21 DIAGNOSIS — S82.899A FRACTURE OF ANKLE, CLOSED, UNSPECIFIED LATERALITY, INITIAL ENCOUNTER: Primary | ICD-10-CM

## 2023-02-21 DIAGNOSIS — S82.899A FRACTURE OF ANKLE, CLOSED, UNSPECIFIED LATERALITY, INITIAL ENCOUNTER: ICD-10-CM

## 2023-02-21 DIAGNOSIS — M25.572 LEFT ANKLE PAIN, UNSPECIFIED CHRONICITY: Primary | ICD-10-CM

## 2023-02-21 DIAGNOSIS — S92.102A CLOSED NONDISPLACED FRACTURE OF LEFT TALUS, UNSPECIFIED PORTION OF TALUS, INITIAL ENCOUNTER: ICD-10-CM

## 2023-02-21 PROCEDURE — 99204 OFFICE O/P NEW MOD 45 MIN: CPT | Performed by: ORTHOPAEDIC SURGERY

## 2023-02-21 PROCEDURE — 73700 CT LOWER EXTREMITY W/O DYE: CPT

## 2023-02-21 RX ORDER — ASPIRIN 325 MG
325 TABLET, DELAYED RELEASE (ENTERIC COATED) ORAL DAILY
Qty: 42 TABLET | Refills: 0 | Status: SHIPPED | OUTPATIENT
Start: 2023-02-21 | End: 2023-04-04

## 2023-02-21 SDOH — HEALTH STABILITY: PHYSICAL HEALTH: ON AVERAGE, HOW MANY DAYS PER WEEK DO YOU ENGAGE IN MODERATE TO STRENUOUS EXERCISE (LIKE A BRISK WALK)?: 5 DAYS

## 2023-02-21 SDOH — HEALTH STABILITY: PHYSICAL HEALTH: ON AVERAGE, HOW MANY MINUTES DO YOU ENGAGE IN EXERCISE AT THIS LEVEL?: 150+ MIN

## 2023-02-21 ASSESSMENT — SOCIAL DETERMINANTS OF HEALTH (SDOH)
WITHIN THE LAST YEAR, HAVE YOU BEEN KICKED, HIT, SLAPPED, OR OTHERWISE PHYSICALLY HURT BY YOUR PARTNER OR EX-PARTNER?: NO
WITHIN THE LAST YEAR, HAVE YOU BEEN AFRAID OF YOUR PARTNER OR EX-PARTNER?: NO
WITHIN THE LAST YEAR, HAVE YOU BEEN HUMILIATED OR EMOTIONALLY ABUSED IN OTHER WAYS BY YOUR PARTNER OR EX-PARTNER?: NO
WITHIN THE LAST YEAR, HAVE TO BEEN RAPED OR FORCED TO HAVE ANY KIND OF SEXUAL ACTIVITY BY YOUR PARTNER OR EX-PARTNER?: NO

## 2023-02-21 NOTE — PROGRESS NOTES
[address] [place] [address] AND SPORTS MEDICINE  [facility] Winifred Harada  [address] [place] [address]  Dept: [phone]    Ambulatory Orthopedic Consult      CHIEF COMPLAINT:    Chief Complaint   Patient presents with    Ankle Pain     Left        HISTORY OF PRESENT ILLNESS:      The patient is a 42 y.o. male who is being seen for evaluation of the above, which began 2/19/2023 secondary to a snowboarding injury  . At today's visit, he is using a brace/boot. History is obtained today from:   [x]  the patient     [x]  EMR     []  one family member/friend    []  multiple family members/friends    []  other:          REVIEW OF SYSTEMS:  Musculoskeletal: See HPI for pertinent positives     Past Medical History:    He  has a past medical history of Anxiety, Bulging lumbar disc, Chronic dryness of both eyes, Hyperlipidemia, and Hypertension. Past Surgical History:    He  has a past surgical history that includes Tonsillectomy; Septoplasty; and eye surgery. Current Medications:     Current Outpatient Medications:     omega-3 acid ethyl esters (LOVAZA) 1 g capsule, TAKE TWO CAPSULES BY MOUTH TWICE A DAY, Disp: 60 capsule, Rfl: 3    ibuprofen (ADVIL;MOTRIN) 200 MG tablet, Take 200 mg by mouth every 6 hours as needed for Pain, Disp: , Rfl:     traMADol (ULTRAM) 50 MG tablet, Take 1 tablet by mouth every 4 hours as needed for Pain for up to 3 days. Intended supply: 5 days.  Take lowest dose possible to manage pain Max Daily Amount: 300 mg, Disp: 18 tablet, Rfl: 0    fenofibrate (TRIGLIDE) 160 MG tablet, TAKE ONE TABLET BY MOUTH DAILY, Disp: 90 tablet, Rfl: 1    sertraline (ZOLOFT) 100 MG tablet, TAKE 1 AND 1/2 TABLET BY MOUTH DAILY, Disp: 135 tablet, Rfl: 0    lisinopril (PRINIVIL;ZESTRIL) 30 MG tablet, TAKE ONE TABLET BY MOUTH DAILY, Disp: 90 tablet, Rfl: 1    hydroCHLOROthiazide (HYDRODIURIL) 25 MG tablet, TAKE ONE TABLET BY MOUTH EVERY MORNING, Disp: 90 tablet, Rfl: 3    Omega-3 Fatty Acids (FISH OIL) 1000 MG CAPS, Take 3,000 mg by mouth 3 times daily, Disp: , Rfl:     Multiple Vitamins-Minerals (MENS MULTI VITAMIN & MINERAL PO), Take by mouth, Disp: , Rfl:      Allergies:    Patient has no known allergies. Family History:  family history includes Cancer in his paternal grandmother; Diabetes in his maternal grandfather; Other in his father, maternal grandmother, mother, and paternal aunt; Stroke in his father. Social History:   Social History     Occupational History    Not on file   Tobacco Use    Smoking status: Never    Smokeless tobacco: Never   Substance and Sexual Activity    Alcohol use: No    Drug use: No    Sexual activity: Not on file         OBJECTIVE:  Resp 16   Ht 5' 10\" (1.778 m)   Wt 231 lb (104.8 kg)   SpO2 100%   BMI 33.15 kg/m²    Psych: awake, alert  Cardio:  well perfused extremities, no cyanosis  Resp:  normal respiratory effort  Musculoskeletal:    Affected lower extremity:    Vascular: Limb well perfused, compartments soft/compressible. Skin: No erythema/ulcers. Intact. Neurovascular Status:  Grossly neurovascularly intact throughout  Motion:  Grossly able to fire major muscle groups with appropriate/expected AROM  Tenderness to Palpation: Ankle diffusely  -Negative squeeze test for syndesmotic injury  -Mild/moderate swelling with diffuse ecchymosis      RADIOLOGY:   2/21/2023 FINDINGS:  Three weightbearing views (AP, Mortise, and Lateral) of the left ankle and three weightbearing views (AP, Oblique, Lateral) of the left foot were obtained in the office today and reviewed, revealing minimally displaced distal fibula fracture, without widening of the clear spaces. Radiolucency through the posterior aspect of the talus as well as the lateral aspect of the talus, possibly representing fracture. IMPRESSION: Osseous injury as above.     Electronically signed by Margarita Myers MD      Relevant previous imaging reviewed, both imaging and report(s) as below:    XR ANKLE LEFT (MIN 3 VIEWS)  Result Date: 2/20/2023  1. Acute obliquely oriented slightly displaced fracture through the distal fibular diaphysis and metaphysis. 2. Moderate soft tissue swelling of the lateral and anterior ankle. Large tibiotalar joint effusion. ASSESSMENT AND PLAN:  Body mass index is 33.15 kg/m². He has a left minimally displaced distal fibula fracture, as well as a questionable talus fracture, sustained on 2/19/2023. Notably, he has the past medical history as above. He has a history of abnormal kidney function and anxiety/ADHD. We had a discussion today about the likely diagnosis and its natural history, physical exam and imaging findings, as well as various treatment options in detail. Surgically, we discussed a possible surgery, depending on the results of his imaging as below. Orders/referrals were placed as below at today's visit. The patient will avoid pain provoking activity. We discussed the risks of displacement. Given the possibility of a talus fracture, I did recommend remaining nonweightbearing in his cam boot. He may remove his cam boot to sleep. He was provided crutches today. We also had a discussion about the risk of blood clot and thromboembolic events. The patient understands that there is an increased risk with surgery/immobilization, and understands nothing will completely eliminate the risk of DVT/PE's, and that any prophylactic medication does not substitute for early mobilization. Given his risk profile, I have recommended the following strategy to decrease the risk of blood clots, and the patient agrees and wishes to proceed:  Aspirin 325 mg PO q Day. In order to know exactly how to proceed with treatment, a stat CT was ordered today to evaluate the talus. This is medically necessary to evaluate the exact bony alignment/architecture.      All questions were answered and the above plan was agreed upon. The patient will return to clinic after the CT has been obtained without x-rays. At his next visit, we will review his CT scan, and possibly consider surgery versus nonsurgical management. At the patient's next visit, depending on how the patient is doing and/or new imaging/labs results, we may consider the following options:    []  Lace up ankle     []  CAM boot         []  removable wrist brace     []  PT:        []  Wean out immobilization         []  Adv activity      []  Footmind        []  Spenco       []  Custom Orthotic:               []  AZ brace                    []  Rocker Bottom      []  Night splint    []  Heel cups        []  Strap        []  Toe gizmos    []  Topl        []  NSAIDs         []  Shital        []  Ref:         []  Stress Xray    []  CT        []  MRI  []  Inj:          []  Consider OR      []  Pick OR date    No follow-ups on file. No orders of the defined types were placed in this encounter. No orders of the defined types were placed in this encounter. Carlo Malloy MD  Orthopedic Surgery        Please excuse any typos/errors, as this note was created with the assistance of voice recognition software. While intending to generate a document that actually reflects the content of the visit, the document can still have some errors including those of syntax and sound-a-like substitutions which may escape proof reading. In such instances, actual meaning can be extrapolated by context.

## 2023-02-21 NOTE — LETTER
[address] [place] [address] and Sports Medicine  [place] [address]  Phone: [phone]  Fax: [phone] Howard Iglesias MD      February 21, 2023     Patient: Florecita Buenrostro   MR Number: [id number]   YOB: 1985   Date of Visit: 2/21/2023       Dear Dr. Carlos Lee:    Thank you for referring Florecita Buenrostro to me for evaluation/treatment. Below are the relevant portions of my assessment and plan of care. He has a left minimally displaced distal fibula fracture, as well as a questionable talus fracture, sustained on 2/19/2023. Notably, he has the past medical history as above. He has a history of abnormal kidney function and anxiety/ADHD. We had a discussion today about the likely diagnosis and its natural history, physical exam and imaging findings, as well as various treatment options in detail. Surgically, we discussed a possible surgery, depending on the results of his imaging as below. Orders/referrals were placed as below at today's visit. The patient will avoid pain provoking activity. We discussed the risks of displacement. Given the possibility of a talus fracture, I did recommend remaining nonweightbearing in his cam boot. He may remove his cam boot to sleep. He was provided crutches today. We also had a discussion about the risk of blood clot and thromboembolic events. The patient understands that there is an increased risk with surgery/immobilization, and understands nothing will completely eliminate the risk of DVT/PE's, and that any prophylactic medication does not substitute for early mobilization. Given his risk profile, I have recommended the following strategy to decrease the risk of blood clots, and the patient agrees and wishes to proceed:  Aspirin 325 mg PO q Day. In order to know exactly how to proceed with treatment, a stat CT was ordered today to evaluate the talus.  This is medically necessary to evaluate the exact bony alignment/architecture. All questions were answered and the above plan was agreed upon. The patient will return to clinic after the CT has been obtained without x-rays. At his next visit, we will review his CT scan, and possibly consider surgery versus nonsurgical management. If you have questions, please do not hesitate to call me. I look forward to following Lalitha Damon along with you.     Sincerely,        Kim Moya MD    CC providers:  FRANCI Martell NP  [address] [place] [address]  [address]

## 2023-02-21 NOTE — LETTER
[address] [place] [address] and Sports Medicine  [address] [address]  Phone: [phone]  Fax: [phone]    Jocelyn Joya MD    February 21, 2023     Rylee Carrion, [address] [place] [id number]    Patient: Garo Armenta   MR Number: [id number]   YOB: 1985   Date of Visit: 2/21/2023       Dear Rylee Carrion:    Thank you for referring Garo Armenta to me for evaluation/treatment. Below are the relevant portions of my assessment and plan of care. He has a left minimally displaced distal fibula fracture, as well as a questionable talus fracture, sustained on 2/19/2023. Notably, he has the past medical history as above. He has a history of abnormal kidney function and anxiety/ADHD. We had a discussion today about the likely diagnosis and its natural history, physical exam and imaging findings, as well as various treatment options in detail. Surgically, we discussed a possible surgery, depending on the results of his imaging as below. Orders/referrals were placed as below at today's visit. The patient will avoid pain provoking activity. We discussed the risks of displacement. Given the possibility of a talus fracture, I did recommend remaining nonweightbearing in his cam boot. He may remove his cam boot to sleep. He was provided crutches today. We also had a discussion about the risk of blood clot and thromboembolic events. The patient understands that there is an increased risk with surgery/immobilization, and understands nothing will completely eliminate the risk of DVT/PE's, and that any prophylactic medication does not substitute for early mobilization. Given his risk profile, I have recommended the following strategy to decrease the risk of blood clots, and the patient agrees and wishes to proceed:  Aspirin 325 mg PO q Day.      In order to know exactly how to proceed with treatment, a stat CT was ordered today to evaluate the talus. This is medically necessary to evaluate the exact bony alignment/architecture. All questions were answered and the above plan was agreed upon. The patient will return to clinic after the CT has been obtained without x-rays. At his next visit, we will review his CT scan, and possibly consider surgery versus nonsurgical management. If you have questions, please do not hesitate to call me. I look forward to following oMno Marina along with you.     Sincerely,      Lindon Baumgarten, MD

## 2023-02-23 ENCOUNTER — OFFICE VISIT (OUTPATIENT)
Dept: ORTHOPEDIC SURGERY | Age: 38
End: 2023-02-23
Payer: COMMERCIAL

## 2023-02-23 VITALS — BODY MASS INDEX: 33.07 KG/M2 | RESPIRATION RATE: 16 BRPM | OXYGEN SATURATION: 100 % | HEIGHT: 70 IN | WEIGHT: 231 LBS

## 2023-02-23 DIAGNOSIS — S82.899A FRACTURE OF ANKLE, CLOSED, UNSPECIFIED LATERALITY, INITIAL ENCOUNTER: Primary | ICD-10-CM

## 2023-02-23 DIAGNOSIS — S92.102A CLOSED NONDISPLACED FRACTURE OF LEFT TALUS, UNSPECIFIED PORTION OF TALUS, INITIAL ENCOUNTER: Primary | ICD-10-CM

## 2023-02-23 PROCEDURE — 99214 OFFICE O/P EST MOD 30 MIN: CPT | Performed by: ORTHOPAEDIC SURGERY

## 2023-02-23 RX ORDER — HYDROCODONE BITARTRATE AND ACETAMINOPHEN 5; 325 MG/1; MG/1
1 TABLET ORAL EVERY 6 HOURS PRN
Qty: 10 TABLET | Refills: 0 | Status: SHIPPED | OUTPATIENT
Start: 2023-02-23 | End: 2023-03-02

## 2023-02-23 NOTE — PROGRESS NOTES
[address] [place] [address] AND SPORTS MEDICINE  [facility] Tyrone Rogel  [address] [address]  Dept: [phone]    Ambulatory Orthopedic Consult      CHIEF COMPLAINT:    Chief Complaint   Patient presents with    Ankle Pain     Left          HISTORY OF PRESENT ILLNESS:      The patient is a 42 y.o. male who is being seen for evaluation of the above, which began 2/19/2023 secondary to a snowboarding injury  . At today's visit, he is using a brace/boot. History is obtained today from:   [x]  the patient     [x]  EMR     []  one family member/friend    []  multiple family members/friends    []  other:      INTERVAL HISTORY 2/23/2023:  He is seen again today in the office for follow up of imaging as below. Since being seen last, the patient is doing about the same overall. At today's visit, he is using a brace/boot and crutches. History is obtained today from:   [x]  the patient     [x]  EMR     []  one family member/friend    []  multiple family members/friends    []  other:        REVIEW OF SYSTEMS:  Musculoskeletal: See HPI for pertinent positives     Past Medical History:    He  has a past medical history of Anxiety, Bulging lumbar disc, Chronic dryness of both eyes, Hyperlipidemia, and Hypertension. Past Surgical History:    He  has a past surgical history that includes Tonsillectomy; Septoplasty; and eye surgery. Current Medications:     Current Outpatient Medications:     aspirin 325 MG EC tablet, Take 1 tablet by mouth daily, Disp: 42 tablet, Rfl: 0    omega-3 acid ethyl esters (LOVAZA) 1 g capsule, TAKE TWO CAPSULES BY MOUTH TWICE A DAY, Disp: 60 capsule, Rfl: 3    ibuprofen (ADVIL;MOTRIN) 200 MG tablet, Take 200 mg by mouth every 6 hours as needed for Pain, Disp: , Rfl:     traMADol (ULTRAM) 50 MG tablet, Take 1 tablet by mouth every 4 hours as needed for Pain for up to 3 days. Intended supply: 5 days.  Take lowest dose possible to manage pain Max Daily Amount: 300 mg, Disp: 18 tablet, Rfl: 0    fenofibrate (TRIGLIDE) 160 MG tablet, TAKE ONE TABLET BY MOUTH DAILY, Disp: 90 tablet, Rfl: 1    sertraline (ZOLOFT) 100 MG tablet, TAKE 1 AND 1/2 TABLET BY MOUTH DAILY, Disp: 135 tablet, Rfl: 0    lisinopril (PRINIVIL;ZESTRIL) 30 MG tablet, TAKE ONE TABLET BY MOUTH DAILY, Disp: 90 tablet, Rfl: 1    hydroCHLOROthiazide (HYDRODIURIL) 25 MG tablet, TAKE ONE TABLET BY MOUTH EVERY MORNING, Disp: 90 tablet, Rfl: 3    Omega-3 Fatty Acids (FISH OIL) 1000 MG CAPS, Take 3,000 mg by mouth 3 times daily, Disp: , Rfl:     Multiple Vitamins-Minerals (MENS MULTI VITAMIN & MINERAL PO), Take by mouth, Disp: , Rfl:      Allergies:    Patient has no known allergies. Family History:  family history includes Cancer in his paternal grandmother; Diabetes in his maternal grandfather; Other in his father, maternal grandmother, mother, and paternal aunt; Stroke in his father. Social History:   Social History     Occupational History    Not on file   Tobacco Use    Smoking status: Never    Smokeless tobacco: Never   Substance and Sexual Activity    Alcohol use: No    Drug use: No    Sexual activity: Not on file         OBJECTIVE:  Resp 16   Ht 5' 10\" (1.778 m)   Wt 231 lb (104.8 kg)   SpO2 100%   BMI 33.15 kg/m²    Psych: awake, alert  Cardio:  well perfused extremities, no cyanosis  Resp:  normal respiratory effort  Musculoskeletal:    Affected lower extremity:    Vascular: Limb well perfused, compartments soft/compressible. Skin: No erythema/ulcers. Intact. Neurovascular Status:  Grossly neurovascularly intact throughout  Motion:  Grossly able to fire major muscle groups with appropriate/expected AROM  Tenderness to Palpation: Ankle diffusely  -Negative squeeze test for syndesmotic injury  -Mild/moderate swelling with diffuse ecchymosis      RADIOLOGY:  2/23/2023 Prior images reviewed for reference.  CT images and radiology report reviewed, as below: 1. Obliquely oriented nondisplaced distal fibular fracture extending into the   anterolateral malleolus. Large tibiotalar joint effusion. 2. Moderate soft tissue edema about the ankle. FINDINGS:  Three weightbearing views (AP, Mortise, and Lateral) of the left ankle and three weightbearing views (AP, Oblique, Lateral) of the left foot were obtained in the office today and reviewed, revealing minimally displaced distal fibula fracture, without widening of the clear spaces. Radiolucency through the posterior aspect of the talus as well as the lateral aspect of the talus, possibly representing fracture. IMPRESSION: Osseous injury as above. Electronically signed by Kirill Ochoa MD      Relevant previous imaging reviewed, both imaging and report(s) as below:    XR ANKLE LEFT (MIN 3 VIEWS)  Result Date: 2/20/2023  1. Acute obliquely oriented slightly displaced fracture through the distal fibular diaphysis and metaphysis. 2. Moderate soft tissue swelling of the lateral and anterior ankle. Large tibiotalar joint effusion. ASSESSMENT AND PLAN:  Body mass index is 33.15 kg/m². He has a left minimally displaced distal fibula fracture, sustained on 2/19/2023. There was initial concern for a possible talus fracture, however, CT on 2/21/2023 did not demonstrate evidence of a talus fracture. Notably, he has the past medical history as above. He has a history of abnormal kidney function and anxiety/ADHD. We had a discussion today about the likely diagnosis and its natural history, physical exam and imaging findings, as well as various treatment options in detail. Surgically, we discussed that most likely surgical not be needed for his fibula, and I anticipate that nonsurgical management will likely yield an appropriate result. We have discussed the risks of reinjury/displacement as well as delayed healing/nonunion and possible future surgery.        Orders/referrals were placed as below at today's visit. The patient will continue to use his Ace wrap and a CAM boot. We discussed that the patient may weight-bear as tolerated, and may remove the boot at night to sleep. He will continue to take his daily aspirin 325 mg p.o. daily for DVT prophylaxis. -At today's visit, he was ordered 10 tabs of Norco for acute pain control. All questions were answered and the above plan was agreed upon. The patient will return to clinic in 4 weeks with left ankle x-rays. At his next visit, we will monitor for displacement, and likely continue the boot for another 2 weeks before allowing him to transition to a lace up ankle brace. At the patient's next visit, depending on how the patient is doing and/or new imaging/labs results, we may consider the following options:    []  Lace up ankle     []  CAM boot         []  removable wrist brace     []  PT:        []  Wean out immobilization         []  Adv activity      []  Footmind        []  Spenco       []  Custom Orthotic:               []  AZ brace                    []  Rocker Bottom      []  Night splint    []  Heel cups        []  Strap        []  Toe gizmos    []  Topl        []  NSAIDs         []  Shital        []  Ref:         []  Stress Xray    []  CT        []  MRI  []  Inj:          []  Consider OR      []  Pick OR date    No follow-ups on file. No orders of the defined types were placed in this encounter. No orders of the defined types were placed in this encounter. Monserrat Dupree MD  Orthopedic Surgery        Please excuse any typos/errors, as this note was created with the assistance of voice recognition software. While intending to generate a document that actually reflects the content of the visit, the document can still have some errors including those of syntax and sound-a-like substitutions which may escape proof reading. In such instances, actual meaning can be extrapolated by context.

## 2023-03-24 DIAGNOSIS — S92.102A CLOSED NONDISPLACED FRACTURE OF LEFT TALUS, UNSPECIFIED PORTION OF TALUS, INITIAL ENCOUNTER: Primary | ICD-10-CM

## 2023-03-28 ENCOUNTER — OFFICE VISIT (OUTPATIENT)
Dept: ORTHOPEDIC SURGERY | Age: 38
End: 2023-03-28
Payer: COMMERCIAL

## 2023-03-28 VITALS — HEIGHT: 70 IN | WEIGHT: 231 LBS | OXYGEN SATURATION: 100 % | RESPIRATION RATE: 16 BRPM | BODY MASS INDEX: 33.07 KG/M2

## 2023-03-28 DIAGNOSIS — S82.899D FRACTURE OF ANKLE, CLOSED, UNSPECIFIED LATERALITY, WITH ROUTINE HEALING, SUBSEQUENT ENCOUNTER: Primary | ICD-10-CM

## 2023-03-28 PROCEDURE — 99212 OFFICE O/P EST SF 10 MIN: CPT | Performed by: ORTHOPAEDIC SURGERY

## 2023-03-28 NOTE — PROGRESS NOTES
[address] [place] [address] AND SPORTS MEDICINE  [facility] Hilton Segovia  [address] [address]  Dept: [phone]    Ambulatory Orthopedic Consult      CHIEF COMPLAINT:    Chief Complaint   Patient presents with    Ankle Pain     Left          HISTORY OF PRESENT ILLNESS:      The patient is a 42 y.o. male who is being seen for evaluation of the above, which began 2/19/2023 secondary to a snowboarding injury  . At today's visit, he is using a brace/boot. History is obtained today from:   [x]  the patient     [x]  EMR     []  one family member/friend    []  multiple family members/friends    []  other:      INTERVAL HISTORY 2/23/2023:  He is seen again today in the office for follow up of imaging as below. Since being seen last, the patient is doing about the same overall. At today's visit, he is using a brace/boot and crutches. History is obtained today from:   [x]  the patient     [x]  EMR     []  one family member/friend    []  multiple family members/friends    []  other:      INTERVAL HISTORY 3/28/2023:  He is seen again today in the office for follow up of a previous issue (as above). Since being seen last, the patient is doing better. At today's visit, he is using a removable brace. History is obtained today from:   [x]  the patient     []  EMR     []  one family member/friend    []  multiple family members/friends    []  other:        REVIEW OF SYSTEMS:  Musculoskeletal: See HPI for pertinent positives     Past Medical History:    He  has a past medical history of Anxiety, Bulging lumbar disc, Chronic dryness of both eyes, Hyperlipidemia, and Hypertension. Past Surgical History:    He  has a past surgical history that includes Tonsillectomy; Septoplasty; and eye surgery.      Current Medications:     Current Outpatient Medications:     aspirin 325 MG EC tablet, Take 1 tablet by mouth daily, Disp: 42 tablet, Rfl: 0    omega-3 acid

## 2023-04-04 ENCOUNTER — HOSPITAL ENCOUNTER (OUTPATIENT)
Dept: PHYSICAL THERAPY | Facility: CLINIC | Age: 38
Setting detail: THERAPIES SERIES
Discharge: HOME | End: 2023-04-04
Payer: COMMERCIAL

## 2023-04-04 PROCEDURE — 97110 THERAPEUTIC EXERCISES: CPT

## 2023-04-04 PROCEDURE — 97161 PT EVAL LOW COMPLEX 20 MIN: CPT

## 2023-04-04 NOTE — CONSULTS
Walking with the CAM boot, movement, lack of movement, twisting. pivoting, standing. What makes it better Ice, medication, heat  Ace bandage wrap for compression   Symptom progression Stable    Sleep Unable to get comfortable. Sleeps with ankle brace (ASO). Objective:    STRENGTH    Left Right   Hip Flex 5 5   Ext 5 5   ABD 5 5   ADD 5 5   Knee Flex 4+ 5   Ext 4+ 5   Ankle DF 3- 5   PF 3 5   INV 3- 5   EVER 3 5            ROM  ° A/P    Left Right   Ankle DF Lacking 10/lacking 4 15   PF 40 55   INV 6 17   EVER 15 25          Edema Left  Right    Ankle:     Figure 8 56.6 cm 57.2 cm              OBSERVATION No Deficit Deficit Not Tested Comments   Posture       Genu Valgus [] [x] []    Palpation [] [x] [] Pain to palpation over the LLE anterior aspect of the lateral malleolus. Sensation [x] [] []    Gait [] [] [] Analysis:  Decreased LLE ankle DF, Bilateral over pronation into midstance, LLE Decreased heel-to-toe motion. Increased LLE knee flexion during swing for toe clearance     FUNCTION Normal Difficult Unable   Sitting [] [x] []   Standing [] [x] []   Ambulation [] [x] []   Lift/Carry [] [x] []   Stairs [] [x] []   Bending [] [x] []   Squat [] [x] []     BALANCE/PROPRIOCEPTION                 [] Not tested   Single Leg Stance Right Left  Pain   Eyes Open  30 seconds  6 seconds []    Eyes Closed  3 seconds  1 seconds []       FUNCTIONAL TESTS PAIN NO PAIN COMMENTS   Squat [x] [] Bilateral knee valgus, weight shift to the right ankle     Flexibility Normal Left tight Right tight   HS [] [x] [x]   gastroc [] [x] [x]      Functional Test: FAAM Score: 46 % functionally impaired       Assessment:    Patient is a 42 y.o male who reports to therapy s/p left fibula fracture. Patient demonstrated decreased range of motion in all planes of the LLE. Patient also had decreased strength of the LLE throughout when compared to the RLE.  Patient reports he had been walking around the house without the CAM boot or

## 2023-04-19 ENCOUNTER — HOSPITAL ENCOUNTER (OUTPATIENT)
Dept: PHYSICAL THERAPY | Facility: CLINIC | Age: 38
Setting detail: THERAPIES SERIES
Discharge: HOME | End: 2023-04-19
Payer: COMMERCIAL

## 2023-04-19 PROCEDURE — 97110 THERAPEUTIC EXERCISES: CPT

## 2023-04-19 NOTE — FLOWSHEET NOTE
reps  - Seated Lesser Toes Extension  - 1 x daily - 7 x weekly - 3 sets - 10 reps  - Long Sitting Calf Stretch with Strap  - 1 x daily - 7 x weekly - 3 sets - 30 (sec) hold  - Standing Hip Abduction with Anchored Resistance  - 1 x daily - 7 x weekly - 3 sets - 10 reps  - Standing Hip Extension with Anchored Resistance  - 1 x daily - 7 x weekly - 3 sets - 10 reps  - Standing Hip Adduction with Anchored Resistance  - 1 x daily - 7 x weekly - 3 sets - 10 reps  - Standing Repeated Hip Flexion with Resistance  - 1 x daily - 7 x weekly - 3 sets - 10 reps  - Step Up  - 1 x daily - 7 x weekly - 3 sets - 10 reps - 30 (sec) hold  - Forward Step Down  - 1 x daily - 7 x weekly - 3 sets - 10 reps - 30 (sec) hold  Method of Education: [x] Verbal  [x] Demo  [] Written  Comprehension of Education:  [x] Verbalizes understanding. [x] Demonstrates understanding. [x] Needs review. [] Demonstrates/verbalizes HEP/Ed previously given. Plan: [x] Continue current frequency toward long and short term goals.             Time In: 0900              Time Out: 0950    Electronically signed by:  Wesley Miner PTA

## 2023-04-26 ENCOUNTER — HOSPITAL ENCOUNTER (OUTPATIENT)
Dept: PHYSICAL THERAPY | Facility: CLINIC | Age: 38
Setting detail: THERAPIES SERIES
Discharge: HOME | End: 2023-04-26
Payer: COMMERCIAL

## 2023-04-26 PROCEDURE — 97110 THERAPEUTIC EXERCISES: CPT

## 2023-04-26 NOTE — FLOWSHEET NOTE
[x] [facility]  Outpatient [facility] &  Therapy  [facility]   [place]: [phone]  F: [phone]      Physical Therapy Daily Treatment Note    Date:  2023  Patient Name:  Jane Thompson  :  1985  MRN: [id number]  Physician: Nkechi Lewis MD                                        Insurance: BCBS (No copay, 30v Hard Max)  Medical Diagnosis: S82.899D (ICD-10-CM) - Fracture of ankle, closed, unspecified laterality, with routine healing, subsequent encounter                        Rehab Codes: M62.81 (Muscle Weakness), M62.9 (Disorder of Muscle), M79.1 (Myalgia), Z73.6  Onset date: 2023                                   Next 's appt. : --    Visit# / total visits:                                   Cancels/No Shows: 0/0       Subjective:    Pain:  [x] Yes  [] No Location: LLE ankle   Pain Rating: (0-10 scale) 1/10  Pain altered Tx:  [x] No  [] Yes  Action:  Comments: Patient arrived noting minimal paint this date. Notes increased soreness with HEP but able to modify to decrease symptoms.        Objective:    Precautions: WBAT in lace-up brace   Exercise     Access Code: [email or web address]     LLE Ankle s/p fibula fracture  Reps/ Time Weight/ Level Comments             Bike (USE Bike next to steps please)  10'             Standing HS stretch  3x30\"       Calf stretch slant board   3x30\"   HEP   BAPS  x20                 SLS Rebounder         Counter reaches  x10   bilat      Balance board  L2x5'       Step-up to \"x20       Step downs  6\"x20       Lunges  2x10       Reverse lunges  2x10       4 way hip   x20  Green  Bilat    Total Gym Squat  2x10  L20    Total Gym heel raises   2x10  L20     Cones  x1     Heel taps  2x10  4\"    Star slides  x10     Monster walks  2L  Green                Treatment Charges: Mins Units   []  Modalities     []  Ther Exercise 45 3   []  Manual Therapy     []  Ther Activities     []  Aquatics     []  Vasocompression     []

## 2023-05-02 ENCOUNTER — HOSPITAL ENCOUNTER (OUTPATIENT)
Dept: PHYSICAL THERAPY | Facility: CLINIC | Age: 38
Setting detail: THERAPIES SERIES
Discharge: HOME | End: 2023-05-02
Payer: COMMERCIAL

## 2023-05-02 PROCEDURE — 97110 THERAPEUTIC EXERCISES: CPT

## 2023-05-02 NOTE — FLOWSHEET NOTE
[x] [facility]  Outpatient Rehabilitation &  Therapy  [facility]   [place]: [phone]  F: [phone]      Physical Therapy Daily Treatment Note    Date:  2023  Patient Name:  Theodore Waldron  :  1985  MRN: [id number]  Physician: Joaquin Aaron MD                                        Insurance: BCBS (No copay, 30v Hard Max)  Medical Diagnosis: S82.899D (ICD-10-CM) - Fracture of ankle, closed, unspecified laterality, with routine healing, subsequent encounter                        Rehab Codes: M62.81 (Muscle Weakness), M62.9 (Disorder of Muscle), M79.1 (Myalgia), Z73.6  Onset date: 2023                                   Next 's appt. : --  Visit# / total visits:                                   Cancels/No Shows: 0/0         Subjective:    Pain:  [x] Yes  [] No Location: LLE ankle   Pain Rating: (0-10 scale) 1/10  Pain altered Tx:  [x] No  [] Yes  Action:  Comments: Patient arrived noting minimal paint this date. Notes increased soreness with HEP but able to modify to decrease symptoms.        Objective:  Precautions: WBAT in lace-up brace   Exercise     Access Code: [email or web address]     LLE Ankle s/p fibula fracture  Reps/ Time Weight/ Level Comments             Bike (USE Bike next to steps please)  10'             Standing HS stretch  3x30\"       Calf stretch slant board   3x30\"   HEP   BAPS  x20                 SLS Rebounder         Counter reaches  x10   bilat      Balance board  L2x5'       Step-up to \"x20       Step downs  4\"x20       Lunges  2x10       Reverse lunges  2x10       4 way hip   x20  Green  Bilat    Total Gym Squat  2x10  L20    Total Gym heel raises   2x10  L20     Cones  x1     Heel taps  2x10  4\"    Star slides  x10     Monster walks  2L  Green                Treatment Charges: Mins Units   []  Modalities     []  Ther Exercise 40 3   []  Manual Therapy     []  Ther Activities     []  Aquatics     []  Vasocompression     []  Other

## 2023-05-05 DIAGNOSIS — S92.102A CLOSED NONDISPLACED FRACTURE OF LEFT TALUS, UNSPECIFIED PORTION OF TALUS, INITIAL ENCOUNTER: Primary | ICD-10-CM

## 2023-05-06 DIAGNOSIS — F41.9 ANXIETY: ICD-10-CM

## 2023-05-08 RX ORDER — SERTRALINE HYDROCHLORIDE 100 MG/1
TABLET, FILM COATED ORAL
Qty: 135 TABLET | Refills: 0 | Status: SHIPPED | OUTPATIENT
Start: 2023-05-08

## 2023-05-09 ENCOUNTER — OFFICE VISIT (OUTPATIENT)
Dept: ORTHOPEDIC SURGERY | Age: 38
End: 2023-05-09
Payer: COMMERCIAL

## 2023-05-09 ENCOUNTER — HOSPITAL ENCOUNTER (OUTPATIENT)
Dept: PHYSICAL THERAPY | Facility: CLINIC | Age: 38
Setting detail: THERAPIES SERIES
Discharge: HOME | End: 2023-05-09
Payer: COMMERCIAL

## 2023-05-09 VITALS — HEIGHT: 70 IN | OXYGEN SATURATION: 100 % | RESPIRATION RATE: 16 BRPM | BODY MASS INDEX: 32.93 KG/M2 | WEIGHT: 230 LBS

## 2023-05-09 DIAGNOSIS — S82.892G CLOSED FRACTURE OF LEFT ANKLE WITH DELAYED HEALING, SUBSEQUENT ENCOUNTER: Primary | ICD-10-CM

## 2023-05-09 PROCEDURE — 97110 THERAPEUTIC EXERCISES: CPT

## 2023-05-09 PROCEDURE — 99212 OFFICE O/P EST SF 10 MIN: CPT | Performed by: ORTHOPAEDIC SURGERY

## 2023-05-09 NOTE — FLOWSHEET NOTE
[x] [facility]  Outpatient Rehabilitation &  Therapy  [facility]   [place]: [phone]  F: [phone]      Physical Therapy Daily Treatment Note    Date:  2023  Patient Name:  Zaida yAers  :  1985  MRN: [id number]  Physician: Cayden Gonzalez MD                                        Insurance: BCBS (No copay, 30v Hard Max)  Medical Diagnosis: S82.899D (ICD-10-CM) - Fracture of ankle, closed, unspecified laterality, with routine healing, subsequent encounter                        Rehab Codes: M62.81 (Muscle Weakness), M62.9 (Disorder of Muscle), M79.1 (Myalgia), Z73.6  Onset date: 2023                                   Next 's appt. : --  Visit# / total visits:                                   Cancels/No Shows: 0/0       Subjective:    Pain:  [x] Yes  [] No Location: LLE ankle   Pain Rating: (0-10 scale) 2/10  Pain altered Tx:  [x] No  [] Yes  Action:  Comments: Patient arrived with ASO brace donned stating no new issues and mild pain.        Objective:  Precautions: WBAT in lace-up brace   Exercise     Access Code: [email or web address]     LLE Ankle s/p fibula fracture  Reps/ Time Weight/ Level Comments             Bike (USE Bike next to steps please)  10'             Standing HS stretch  3x30\"       Calf stretch slant board   3x30\"   HEP   BAPS  x20                 SLS Rebounder         Counter reaches  x10   bilat      Balance board  L2x5'       Step-up to \"x20       Step downs  6\"x20       Lateral heel taps   6\"x20     Lunges  2x10       Reverse lunges+march  2x10       4 way hip   x20  Green  Bilat    Total Gym Squat  2x10  L20    Total Gym heel raises   2x10  L20     Cones  x1     Star slides  x10     Monster walks  2L  Green                Treatment Charges: Mins Units   []  Modalities     [x]  Ther Exercise 40 3   []  Manual Therapy     []  Ther Activities     []  Aquatics     []  Vasocompression     []  Other     Total Treatment time 40 3

## 2023-05-16 ENCOUNTER — HOSPITAL ENCOUNTER (OUTPATIENT)
Dept: PHYSICAL THERAPY | Facility: CLINIC | Age: 38
Setting detail: THERAPIES SERIES
Discharge: HOME | End: 2023-05-16
Payer: COMMERCIAL

## 2023-05-16 PROCEDURE — 97110 THERAPEUTIC EXERCISES: CPT

## 2023-05-16 NOTE — FLOWSHEET NOTE
[x] [facility]  Outpatient Rehabilitation &  Therapy  [facility]   [place]: [phone]  F: [phone]      Physical Therapy Daily Treatment Note    Date:  2023  Patient Name:  Gena Kaufman  :  1985  MRN: [id number]  Physician: Terrence Villatoro MD                                        Insurance: BCBS (No copay, 30v Hard Max)  Medical Diagnosis: S82.899D (ICD-10-CM) - Fracture of ankle, closed, unspecified laterality, with routine healing, subsequent encounter                        Rehab Codes: M62.81 (Muscle Weakness), M62.9 (Disorder of Muscle), M79.1 (Myalgia), Z73.6  Onset date: 2023                                   Next 's appt. : --  Visit# / total visits:                                   Cancels/No Shows: 0/0         Subjective:    Pain:  [x] Yes  [] No Location: LLE ankle   Pain Rating: (0-10 scale) 2/10  Pain altered Tx:  [x] No  [] Yes  Action:  Comments: Patient arrived with ASO brace on.        Objective:  Precautions: WBAT in lace-up brace   Exercise     Access Code: [email or web address]     LLE Ankle s/p fibula fracture  Reps/ Time Weight/ Level Comments             Bike (USE Bike next to steps please)  10'             Standing HS stretch  3x30\"       Calf stretch slant board   3x30\"   HEP   BAPS  x20                 SLS Rebounder  x20 Foam       Counter reaches  x10   bilat      Balance board  L2x5'       Step-up to march  6\"x20       Step downs  6\"x20       Lateral heel taps   6\"x20     Lunges  2x10       Reverse lunges+march  2x10       4 way hip   x20  Green  Bilat    Total Gym Squat  2x10  L20    Total Gym heel raises   2x10  L20     Cones  x2     Star slides  x10     Monster walks  2L  [employer] walk lateral  [employer]         Updated Radiology:    2023 FINDINGS:  Three weightbearing views (AP, Mortise, and Lateral) of the left ankle were obtained in the office today and reviewed, revealing minimally displaced distal fibula

## 2023-05-17 ENCOUNTER — OFFICE VISIT (OUTPATIENT)
Dept: FAMILY MEDICINE CLINIC | Age: 38
End: 2023-05-17
Payer: COMMERCIAL

## 2023-05-17 VITALS
SYSTOLIC BLOOD PRESSURE: 126 MMHG | DIASTOLIC BLOOD PRESSURE: 72 MMHG | OXYGEN SATURATION: 97 % | BODY MASS INDEX: 34.93 KG/M2 | HEART RATE: 61 BPM | TEMPERATURE: 97.1 F | HEIGHT: 70 IN | WEIGHT: 244 LBS

## 2023-05-17 DIAGNOSIS — I10 ESSENTIAL HYPERTENSION: ICD-10-CM

## 2023-05-17 DIAGNOSIS — Z00.00 ENCOUNTER FOR WELL ADULT EXAM WITHOUT ABNORMAL FINDINGS: Primary | ICD-10-CM

## 2023-05-17 DIAGNOSIS — E78.1 HYPERTRIGLYCERIDEMIA: ICD-10-CM

## 2023-05-17 DIAGNOSIS — S92.102A CLOSED NONDISPLACED FRACTURE OF LEFT TALUS, UNSPECIFIED PORTION OF TALUS, INITIAL ENCOUNTER: Primary | ICD-10-CM

## 2023-05-17 PROCEDURE — 99396 PREV VISIT EST AGE 40-64: CPT | Performed by: PHYSICIAN ASSISTANT

## 2023-05-17 PROCEDURE — 3078F DIAST BP <80 MM HG: CPT | Performed by: PHYSICIAN ASSISTANT

## 2023-05-17 PROCEDURE — 3074F SYST BP LT 130 MM HG: CPT | Performed by: PHYSICIAN ASSISTANT

## 2023-05-17 RX ORDER — OMEGA-3-ACID ETHYL ESTERS 1 G/1
CAPSULE, LIQUID FILLED ORAL
Qty: 360 CAPSULE | Refills: 3 | Status: SHIPPED | OUTPATIENT
Start: 2023-05-17 | End: 2023-05-21

## 2023-05-17 SDOH — ECONOMIC STABILITY: HOUSING INSECURITY
IN THE LAST 12 MONTHS, WAS THERE A TIME WHEN YOU DID NOT HAVE A STEADY PLACE TO SLEEP OR SLEPT IN A SHELTER (INCLUDING NOW)?: NO

## 2023-05-17 SDOH — ECONOMIC STABILITY: INCOME INSECURITY: HOW HARD IS IT FOR YOU TO PAY FOR THE VERY BASICS LIKE FOOD, HOUSING, MEDICAL CARE, AND HEATING?: NOT HARD AT ALL

## 2023-05-17 SDOH — ECONOMIC STABILITY: FOOD INSECURITY: WITHIN THE PAST 12 MONTHS, YOU WORRIED THAT YOUR FOOD WOULD RUN OUT BEFORE YOU GOT MONEY TO BUY MORE.: NEVER TRUE

## 2023-05-17 SDOH — ECONOMIC STABILITY: FOOD INSECURITY: WITHIN THE PAST 12 MONTHS, THE FOOD YOU BOUGHT JUST DIDN'T LAST AND YOU DIDN'T HAVE MONEY TO GET MORE.: NEVER TRUE

## 2023-05-17 ASSESSMENT — ENCOUNTER SYMPTOMS
CONSTIPATION: 0
SINUS PRESSURE: 0
BACK PAIN: 0
BLOOD IN STOOL: 0
NAUSEA: 0
WHEEZING: 0
DIARRHEA: 0
TROUBLE SWALLOWING: 0
EYE REDNESS: 0
VOICE CHANGE: 0
VOMITING: 0
COUGH: 0
ABDOMINAL PAIN: 0
SHORTNESS OF BREATH: 0
EYE DISCHARGE: 0
RHINORRHEA: 0
COLOR CHANGE: 0
CHEST TIGHTNESS: 0
SORE THROAT: 0
SINUS PAIN: 0

## 2023-05-17 ASSESSMENT — PATIENT HEALTH QUESTIONNAIRE - PHQ9
SUM OF ALL RESPONSES TO PHQ9 QUESTIONS 1 & 2: 0
2. FEELING DOWN, DEPRESSED OR HOPELESS: 0
SUM OF ALL RESPONSES TO PHQ QUESTIONS 1-9: 0
1. LITTLE INTEREST OR PLEASURE IN DOING THINGS: 0

## 2023-05-17 NOTE — PATIENT INSTRUCTIONS
and stay at your healthy weight. This will lower your risk for many health problems. Take care of your mental health. Try to stay connected with friends, family, and community, and find ways to manage stress. If you're feeling depressed or hopeless, talk to someone. A counselor can help. If you don't have a counselor, talk to your doctor. Talk to your doctor if you think you may have a problem with alcohol or drug use. This includes prescription medicines and illegal drugs. Avoid tobacco and nicotine: Don't smoke, vape, or chew. If you need help quitting, talk to your doctor. Practice safer sex. Getting tested, using condoms or dental dams, and limiting sex partners can help prevent STIs. Use birth control if it's important to you to prevent pregnancy. Talk with your doctor about your choices and what might be best for you. Prevent problems where you can. Protect your skin from too much sun, wash your hands, brush your teeth twice a day, and wear a seat belt in the car. Where can you learn more? Go to http://www.concepcion.com/ and enter P072 to learn more about \"Well Visit, Ages 25 to 72: Care Instructions. \"  Current as of: November 14, 2022               Content Version: 13.6  © 8590-8598 [employer]. Care instructions adapted under license by [facility] ([facility]). If you have questions about a medical condition or this instruction, always ask your healthcare professional. [address] [place] [address] any warranty or liability for your use of this information.

## 2023-05-17 NOTE — PROGRESS NOTES
Well Adult Note  Name: Paluy Blackmon Date: 2023   MRN: [id number] Sex: Male   Age: 34 y.o. Ethnicity: Non- / Non    : 1985 Race: White (non-)      Galilea Camacho is here for well adult exam.  History:    Patient is here for well exam. He states doing well. Still following with Dr. Karthik Benjamin for his distal left fibula fracture. Patient states he is doing PT presently for this and has been healing slowly. He is looking forward to being more active this summer  He has been seeing his dentist regularly and plans to find a new eye doctor soon  Doing well on present medications. BP in good range    Review of Systems   Constitutional: Negative. Negative for activity change, appetite change, fatigue, fever and unexpected weight change. HENT:  Negative for congestion, ear pain, postnasal drip, rhinorrhea, sinus pressure, sinus pain, sneezing, sore throat, trouble swallowing and voice change. Eyes:  Negative for discharge, redness and visual disturbance. Respiratory:  Negative for cough, chest tightness, shortness of breath and wheezing. Cardiovascular:  Negative for chest pain, palpitations and leg swelling. Gastrointestinal:  Negative for abdominal pain, blood in stool, constipation, diarrhea, nausea and vomiting. Endocrine: Negative for cold intolerance and heat intolerance. Genitourinary:  Negative for dysuria, flank pain, frequency and urgency. Musculoskeletal:  Negative for arthralgias, back pain, gait problem, joint swelling and myalgias. Skin:  Negative for color change, pallor, rash and wound. Allergic/Immunologic: Negative for environmental allergies and food allergies. Neurological:  Negative for weakness, light-headedness and headaches. Hematological:  Negative for adenopathy. Does not bruise/bleed easily. Psychiatric/Behavioral:  Negative for agitation, behavioral problems, confusion and sleep disturbance. The patient is not nervous/anxious.

## 2023-05-17 NOTE — PROGRESS NOTES
Visit Information    Have you changed or started any medications since your last visit including any over-the-counter medicines, vitamins, or herbal medicines? no   Are you having any side effects from any of your medications? -  no  Have you stopped taking any of your medications? Is so, why? -  no    Have you seen any other physician or provider since your last visit? No  Have you had any other diagnostic tests since your last visit? No  Have you been seen in the emergency room and/or had an admission to a hospital since we last saw you? No  Have you had your routine dental cleaning in the past 6 months? no    Have you activated your [employer] account? If not, what are your barriers?  Yes     Patient Care Team:  Kasandra Young PA-C as PCP - General (Family Medicine)  Kasandra Young PA-C as PCP - Empaneled Provider    Medical History Review  Past Medical, Family, and Social History reviewed and  contribute to the patient presenting condition    Health Maintenance   Topic Date Due    COVID-19 Vaccine (1) Never done    HIV screen  Never done    Hepatitis C screen  Never done    Varicella vaccine (1 of 2 - 2-dose childhood series) Never done    Flu vaccine (Season Ended) 08/01/2023    Depression Screen  08/25/2023    Lipids  08/24/2027    DTaP/Tdap/Td vaccine (3 - Td or Tdap) 10/28/2028    Hepatitis A vaccine  Aged Out    Hib vaccine  Aged Out    Meningococcal (ACWY) vaccine  Aged Out    Pneumococcal 0-64 years Vaccine  Aged Out

## 2023-05-18 ENCOUNTER — TELEPHONE (OUTPATIENT)
Dept: ORTHOPEDIC SURGERY | Age: 38
End: 2023-05-18

## 2023-05-21 RX ORDER — OMEGA-3-ACID ETHYL ESTERS 1 G/1
CAPSULE, LIQUID FILLED ORAL
Qty: 60 CAPSULE | Refills: 3 | Status: SHIPPED | OUTPATIENT
Start: 2023-05-21

## 2023-05-23 ENCOUNTER — HOSPITAL ENCOUNTER (OUTPATIENT)
Dept: PHYSICAL THERAPY | Facility: CLINIC | Age: 38
Setting detail: THERAPIES SERIES
Discharge: HOME | End: 2023-05-23
Payer: COMMERCIAL

## 2023-05-23 PROCEDURE — 97110 THERAPEUTIC EXERCISES: CPT

## 2023-05-23 NOTE — FLOWSHEET NOTE
[x] [facility]  Outpatient Rehabilitation &  Therapy  [facility]   [place]: [phone]  F: [phone]        Physical Therapy Daily Treatment Note      Date:  2023  Patient Name:  Colby Shipley  :  1985  MRN: [id number]  Physician: Jun Rome MD                                        Insurance: BCBS (No copay, 30v Hard Max)  Medical Diagnosis: S82.899D (ICD-10-CM) - Fracture of ankle, closed, unspecified laterality, with routine healing, subsequent encounter                        Rehab Codes: M62.81 (Muscle Weakness), M62.9 (Disorder of Muscle), M79.1 (Myalgia), Z73.6  Onset date: 2023                                   Next 's appt. : --  Visit# / total visits:                                   Cancels/No Shows: 0/0       Subjective:    Pain:  [x] Yes  [] No Location: LLE ankle   Pain Rating: (0-10 scale) 2/10  Pain altered Tx:  [x] No  [] Yes  Action:  Comments: Patient arrived with ASO brace on and no pain at rest, mild pain 2/10 with movement.        Objective:  Precautions: WBAT in lace-up brace   Exercise     Access Code: [email or web address]     LLE Ankle s/p fibula fracture  Reps/ Time Weight/ Level Comments             Elliptical   10'             Standing HS stretch  3x30\"       Calf stretch slant board   3x30\"   HEP   BAPS  x20                 SLS Rebounder  x20 Foam    bilat   Counter reaches  x10   bilat      Balance board  L2x5'       Step-up to \"x20  bilat     Step downs  6\"x20       Lateral heel taps   4\"x20     Lunges  2x10       Reverse lunges+march  2x10       4 way hip   x20  Blue   Bilat    Total Gym Squat  2x10  L20    Total Gym heel raises   2x10  L20     Cones  x2     Star slides  x10     Monster walks  2L  [employer] walk lateral  [employer]         Updated Radiology:    2023 FINDINGS:  Three weightbearing views (AP, Mortise, and Lateral) of the left ankle were obtained in the office today and reviewed, revealing

## 2023-05-30 ENCOUNTER — HOSPITAL ENCOUNTER (OUTPATIENT)
Dept: PHYSICAL THERAPY | Facility: CLINIC | Age: 38
Setting detail: THERAPIES SERIES
Discharge: HOME | End: 2023-05-30
Payer: COMMERCIAL

## 2023-05-30 PROCEDURE — 97110 THERAPEUTIC EXERCISES: CPT

## 2023-05-30 NOTE — FLOWSHEET NOTE
run\"      Pt. Education:  [x] Yes  [] No  [x] Reviewed Prior HEP/Ed, form and avoidance of painful positions   Method of Education: [x] Verbal  [x] Demo  [] Written  Comprehension of Education:  [x] Verbalizes understanding. [x] Demonstrates understanding. [] Needs review. [x] Demonstrates/verbalizes HEP/Ed previously given. Plan: [x] Continue current frequency toward long and short term goals.             Time In: 1441       Time Out: 1050    Electronically signed by:  Awilda Mary, PT

## 2023-05-31 ENCOUNTER — TELEPHONE (OUTPATIENT)
Dept: ORTHOPEDIC SURGERY | Age: 38
End: 2023-05-31

## 2023-05-31 NOTE — TELEPHONE ENCOUNTER
Pt called stating he needs an xray ordered by Dr. Katherine Lemos per Alex. It has been 90 days since injury and ins will now cover. Please call pt when order is in place.

## 2023-05-31 NOTE — TELEPHONE ENCOUNTER
Spoke with patient. He stated that Corinna Guzman from [facility] called him today, and because of his insurance, he needs another x-ray in the next 23 days for the bone stimulator to be approved. She told him that he needs an x-ray 90 days out from his injury. From what he was told, this is a new policy with the Sharla Woody of [address] insurance starting May 1, 2023. Dr. Martínez Search, would you be willing or order an x-ray for him to have done without an appointment? He does not follow up with you until 8/8/23. Please advise.

## 2023-06-05 NOTE — TELEPHONE ENCOUNTER
I am not sure if their dates are off, but his insurance is saying the x-ray done on 5/9 was done at the [facility]. Please advise.

## 2023-06-06 ENCOUNTER — HOSPITAL ENCOUNTER (OUTPATIENT)
Dept: PHYSICAL THERAPY | Facility: CLINIC | Age: 38
Setting detail: THERAPIES SERIES
Discharge: HOME | End: 2023-06-06
Payer: COMMERCIAL

## 2023-06-06 PROCEDURE — 97110 THERAPEUTIC EXERCISES: CPT

## 2023-06-06 NOTE — FLOWSHEET NOTE
Education:  [x] Yes  [] No  [x] Reviewed Prior HEP/Ed, proper form, stretches   Method of Education: [x] Verbal  [x] Demo  [] Written  Comprehension of Education:  [x] Verbalizes understanding. [x] Demonstrates understanding. [] Needs review. [x] Demonstrates/verbalizes HEP/Ed previously given. Plan: [x] Continue current frequency toward long and short term goals.             Time In: 9884      Time Out: [facility] [address]    Electronically signed by:  Iona Morillo, PT

## 2023-06-06 NOTE — TELEPHONE ENCOUNTER
I sent Shruti Beebe an email, I let her know that it appears the 5/9 xray is 92 days post injury. Im waiting to hear back from her on what may be needed.

## 2023-06-07 ENCOUNTER — TELEPHONE (OUTPATIENT)
Dept: ORTHOPEDIC SURGERY | Age: 38
End: 2023-06-07

## 2023-06-07 NOTE — TELEPHONE ENCOUNTER
Dr. Gissel Hammer,     Just an FYI. We did not need to do anything further with this patient as he paid for his stimulator out of pocket.

## 2023-06-07 NOTE — TELEPHONE ENCOUNTER
Patient called to report that it was cheaper to just pay for bone stimulator out of pocket, Patient states it was processed as insurance denial. Patient no longer needs any assistance from office as he states his device is out from delivery now and he should have it by Friday this week.

## 2023-06-13 ENCOUNTER — APPOINTMENT (OUTPATIENT)
Dept: PHYSICAL THERAPY | Facility: CLINIC | Age: 38
End: 2023-06-13
Payer: COMMERCIAL

## 2023-06-20 ENCOUNTER — APPOINTMENT (OUTPATIENT)
Dept: PHYSICAL THERAPY | Facility: CLINIC | Age: 38
End: 2023-06-20
Payer: COMMERCIAL

## 2023-06-22 ENCOUNTER — HOSPITAL ENCOUNTER (OUTPATIENT)
Dept: PHYSICAL THERAPY | Facility: CLINIC | Age: 38
Setting detail: THERAPIES SERIES
Discharge: HOME | End: 2023-06-22
Payer: COMMERCIAL

## 2023-06-22 PROCEDURE — 97110 THERAPEUTIC EXERCISES: CPT

## 2023-06-22 NOTE — FLOWSHEET NOTE
Modalities     [x]  Ther Exercise 53 4   []  Manual Therapy     []  Ther Activities     []  Aquatics     []  Vasocompression     []  Other     Total Treatment time 53 4       Assessment: [x] Progressing toward goals. Continued current program. Reports some pain with forward monster walks, but it did not last long. Encouraged to continue with the bone stimulation as prescribed. To continue to progress as tolerated. [] No change. [] Other:  [x] Patient would benefit from skilled physical therapy services in order to improve mobility, gait, flexibility, ROM, and strength of the LLE throughout to return to completion of ADLs and recreational activities without pain. STG/LTG:  Goals  MET NOT MET ON-  GOING  Details   Date Addressed:           STG: To be met in 10 treatments            1. ? Pain: Decrease pain levels of the LLE ankle to 2/10 with ADLs []  []  []      2. ? ROM: Improve LLE ankle DF to 15 deg, PF to 60 deg, inversion to 30, and eversion to 18 to ease gait and achieve normalized gait []  []  []      3. ? Strength: Increase MMT to 5/5 throughout to ease functional limitations and mobility  []  []  []      4. Independent with Home Exercise Programs []  []  []      5. Patient to demonstrate ability to ambulate with normalized gait and step pattern, with correct heel-to-toe motion []  []  []             Date Addressed:            LTG: To be met in 20 treatments           1. Improve score on assessment tool FAAM from 46% impairment to less than 20% impairment  []  []  []      2. Reduce pain levels of the LLE ankle to 1/10 or less with ADLs []  []  []      3. Patient to demonstrate ability to complete dynamic mobility and plyometric movements without pain []  []  []                        Patient goals: \"walk normally, run\"      Pt.  Education:  [x] Yes  [] No  [x] Reviewed Prior HEP/Ed, proper form, stretches   Method of Education: [x] Verbal  [x] Demo  [] Written  Comprehension of Education:  [x]

## 2023-06-27 ENCOUNTER — APPOINTMENT (OUTPATIENT)
Dept: PHYSICAL THERAPY | Facility: CLINIC | Age: 38
End: 2023-06-27
Payer: COMMERCIAL

## 2023-06-29 ENCOUNTER — HOSPITAL ENCOUNTER (OUTPATIENT)
Dept: PHYSICAL THERAPY | Facility: CLINIC | Age: 38
Setting detail: THERAPIES SERIES
Discharge: HOME | End: 2023-06-29
Payer: COMMERCIAL

## 2023-06-29 PROCEDURE — 97110 THERAPEUTIC EXERCISES: CPT

## 2023-07-03 DIAGNOSIS — I10 ESSENTIAL HYPERTENSION: ICD-10-CM

## 2023-07-03 RX ORDER — LISINOPRIL 30 MG/1
TABLET ORAL
Qty: 90 TABLET | Refills: 1 | Status: SHIPPED | OUTPATIENT
Start: 2023-07-03

## 2023-07-13 ENCOUNTER — HOSPITAL ENCOUNTER (OUTPATIENT)
Dept: PHYSICAL THERAPY | Facility: CLINIC | Age: 38
Setting detail: THERAPIES SERIES
Discharge: HOME | End: 2023-07-13
Payer: COMMERCIAL

## 2023-07-13 PROCEDURE — 97110 THERAPEUTIC EXERCISES: CPT

## 2023-07-13 NOTE — FLOWSHEET NOTE
[x] [facility]  Outpatient Rehabilitation &  Therapy  [address] [facility]   [address]   [place]: [phone]  F: [phone]        Physical Therapy Daily Treatment Note      Date:  2023  Patient Name:  Ammon Carrera  :  1985  MRN: [id number]  Physician: Cee Knox MD                                        Insurance: BCBS (No copay, 30v Hard Max)  Medical Diagnosis: S82.899D (ICD-10-CM) - Fracture of ankle, closed, unspecified laterality, with routine healing, subsequent encounter                        Rehab Codes: M62.81 (Muscle Weakness), M62.9 (Disorder of Muscle), M79.1 (Myalgia), Z73.6  Onset date: 2023                                   Next 's appt. : --    Visit# / total visits:                                   Cancels/No Shows: 0/0       Subjective:    Pain:  [x] Yes  [] No Location: LLE ankle   Pain Rating: (0-10 scale) 0/10  Pain altered Tx:  [x] No  [] Yes  Action:  Comments: Patient arrived noting no pain or soreness with no new issues to report.        Objective:  Precautions: WBAT in lace-up brace   Exercise     Access Code: [email or web address]     LLE Ankle s/p fibula fracture  Reps/ Time Weight/ Level Comments             Elliptical   10'             Standing HS stretch  3x30\"       Calf stretch slant board   3x30\"   HEP             SLS Rebounder  x20 Foam    bilat          Balance board  L2x5'       Step-up to \"x20  bilat     Step downs  x20 6\"     Lateral heel taps   x20 6\"    Lunges  2x10 BOSU     Reverse lunges+march  2x10       4 way hip   x20  Blue   Bilat    Total Gym Squat  2x10  L20    Total Gym heel raises   2x10  L20     Cones  x3  Bilat    Star slides       Monster walks  2L Blue  Knees   Monster walk lateral  2L  Blue  Knees        Treatment Charges: Mins Units   []  Modalities     [x]  Ther Exercise 45 3   []  Manual Therapy     []  Ther Activities     []  Aquatics     []  Vasocompression     []  Other     Total Treatment time 45 3

## 2023-07-20 ENCOUNTER — HOSPITAL ENCOUNTER (OUTPATIENT)
Dept: PHYSICAL THERAPY | Facility: CLINIC | Age: 38
Setting detail: THERAPIES SERIES
Discharge: HOME | End: 2023-07-20
Payer: COMMERCIAL

## 2023-07-20 PROCEDURE — 97110 THERAPEUTIC EXERCISES: CPT

## 2023-07-26 ENCOUNTER — HOSPITAL ENCOUNTER (OUTPATIENT)
Dept: PHYSICAL THERAPY | Facility: CLINIC | Age: 38
Setting detail: THERAPIES SERIES
Discharge: HOME | End: 2023-07-26
Payer: COMMERCIAL

## 2023-07-26 PROCEDURE — 97110 THERAPEUTIC EXERCISES: CPT

## 2023-07-26 NOTE — FLOWSHEET NOTE
[x] [facility]  Outpatient Rehabilitation &  Therapy  [address]   [address]   [place]: [phone]  F: [phone]        Physical Therapy Daily Treatment Note      Date:  2023  Patient Name:  Willian Gomez  :  1985  MRN: [id number]  Physician: Gilberto Schofield MD                                        Insurance: BCBS (No copay, 30v Hard Max)  Medical Diagnosis: S82.899D (ICD-10-CM) - Fracture of ankle, closed, unspecified laterality, with routine healing, subsequent encounter                        Rehab Codes: M62.81 (Muscle Weakness), M62.9 (Disorder of Muscle), M79.1 (Myalgia), Z73.6  Onset date: 2023                                   Next 's appt. : --    Visit# / total visits: 15/20                                  Cancels/No Shows: 0/0       Subjective:    Pain:  [] Yes  [x] No Location: LLE ankle   Pain Rating: (0-10 scale) 0/10  Pain altered Tx:  [x] No  [] Yes  Action:  Comments: Patient arrived noting no pain and notes decreased stiffness.      Objective:  Precautions: WBAT in lace-up brace   Exercise     Access Code: [email or web address]     LLE Ankle s/p fibula fracture  Reps/ Time Weight/ Level Comments             Elliptical   10'             Standing HS stretch  3x30\"       Calf stretch slant board   3x30\"   HEP             SLS Rebounder  x20  Foam    bilat   Balance board  L2x5'       Step-up to march  x20  BOSU     Step downs  x20  6\"     Lateral heel taps   x20  6\"    Lunges  2x10  BOSU     Reverse lunges+toe raise  2x10       4 way hip   x20  Blue   Bilat    Total Gym SL Squat  2x10  L20    Total Gym heel raises   2x10  L20     Cones  x3  Foam Bilat    Monster walks  2L  Blue  Knees   Monster walk lateral  2L   Blue  Knees        Treatment Charges: Mins Units   []  Modalities     [x]  Ther Exercise 45 3   []  Manual Therapy     []  Ther Activities     []  Aquatics     []  Vasocompression     []  Other     Total Treatment time 45 3       Assessment: [x]

## 2023-08-02 DIAGNOSIS — S82.892G CLOSED FRACTURE OF LEFT ANKLE WITH DELAYED HEALING, SUBSEQUENT ENCOUNTER: Primary | ICD-10-CM

## 2023-08-03 ENCOUNTER — HOSPITAL ENCOUNTER (OUTPATIENT)
Dept: PHYSICAL THERAPY | Facility: CLINIC | Age: 38
Setting detail: THERAPIES SERIES
Discharge: HOME | End: 2023-08-03
Payer: COMMERCIAL

## 2023-08-03 PROCEDURE — 97110 THERAPEUTIC EXERCISES: CPT

## 2023-08-03 NOTE — FLOWSHEET NOTE
[x] [facility]  Outpatient Rehabilitation &  Therapy  [address]   [address]   [place]: [phone]  F: [phone]        Physical Therapy Daily Treatment Note      Date:  8/3/2023  Patient Name:  Kelle Moe  :  1985  MRN: [id number]  Physician: Mike Deluna MD                                        Insurance: BCBS (No copay, 30v Hard Max)  Medical Diagnosis: S82.899D (ICD-10-CM) - Fracture of ankle, closed, unspecified laterality, with routine healing, subsequent encounter                        Rehab Codes: M62.81 (Muscle Weakness), M62.9 (Disorder of Muscle), M79.1 (Myalgia), Z73.6  Onset date: 2023                                   Next 's appt. : 23     Visit# / total visits:                                   Cancels/No Shows: 0/0       Subjective:    Pain:  [] Yes  [x] No Location: LLE ankle   Pain Rating: (0-10 scale) 0/10  Pain altered Tx:  [x] No  [] Yes  Action:  Comments: Patient arrived stating tightness in soleus and gastroc. Reports he thinks foam rolling would help but has not tried it yet because he wanted to get confirmation that it was okay to do. Patient reports he feels a better stretch doing a single leg gastroc stretch on the step versus the slantboard.      Objective:  Precautions: WBAT in lace-up brace   Exercise     Access Code: [email or web address]     LLE Ankle s/p fibula fracture  Reps/ Time Weight/ Level Comments             Elliptical   10'             Standing HS stretch  3x30\"       Calf stretch off step  3x30\"   HEP             SLS Rebounder 3-way  x20  Foam    bilat   Balance board  L4x5'       Step-up to march  x20  BOSU     Step downs  x20  6\"     Lateral heel taps   x20  6\"    Lunges  2x10  BOSU     Reverse lunges+toe raise  2x10       4 way hip   x20  Blue   Bilat    Total Gym SL Squat  2x10  L20    Total Gym heel raises   2x10  L20     Cones  x3  Foam Bilat    Monster walks  2L  Blue  Knees   Monster walk lateral  2L   Blue  Knees

## 2023-08-04 DIAGNOSIS — E78.1 HYPERTRIGLYCERIDEMIA: ICD-10-CM

## 2023-08-04 DIAGNOSIS — F41.9 ANXIETY: ICD-10-CM

## 2023-08-04 RX ORDER — SERTRALINE HYDROCHLORIDE 100 MG/1
TABLET, FILM COATED ORAL
Qty: 135 TABLET | Refills: 0 | Status: SHIPPED | OUTPATIENT
Start: 2023-08-04

## 2023-08-04 RX ORDER — FENOFIBRATE 160 MG/1
TABLET ORAL
Qty: 90 TABLET | Refills: 1 | Status: SHIPPED | OUTPATIENT
Start: 2023-08-04

## 2023-08-08 ENCOUNTER — OFFICE VISIT (OUTPATIENT)
Dept: ORTHOPEDIC SURGERY | Age: 38
End: 2023-08-08
Payer: COMMERCIAL

## 2023-08-08 VITALS — RESPIRATION RATE: 14 BRPM | BODY MASS INDEX: 34.93 KG/M2 | HEIGHT: 70 IN | WEIGHT: 244 LBS

## 2023-08-08 DIAGNOSIS — S82.892G CLOSED FRACTURE OF LEFT ANKLE WITH DELAYED HEALING, SUBSEQUENT ENCOUNTER: Primary | ICD-10-CM

## 2023-08-08 PROCEDURE — 99212 OFFICE O/P EST SF 10 MIN: CPT | Performed by: ORTHOPAEDIC SURGERY

## 2023-08-08 NOTE — PROGRESS NOTES
[address] [facility] AND SPORTS MEDICINE  [address] [facility] [place]  [address] [address]  Dept: [phone]    Ambulatory Orthopedic Consult      CHIEF COMPLAINT:    Chief Complaint   Patient presents with    Ankle Pain     Left         HISTORY OF PRESENT ILLNESS:      The patient is a 34 y.o. male who is being seen for evaluation of the above, which began 2/19/2023 secondary to a snowboarding injury  . At today's visit, he is using a brace/boot. History is obtained today from:   [x]  the patient     [x]  EMR     []  one family member/friend    []  multiple family members/friends    []  other:      INTERVAL HISTORY 2/23/2023:  He is seen again today in the office for follow up of imaging as below. Since being seen last, the patient is doing about the same overall. At today's visit, he is using a brace/boot and crutches. History is obtained today from:   [x]  the patient     [x]  EMR     []  one family member/friend    []  multiple family members/friends    []  other:      INTERVAL HISTORY 3/28/2023:  He is seen again today in the office for follow up of a previous issue (as above). Since being seen last, the patient is doing better. At today's visit, he is using a removable brace. History is obtained today from:   [x]  the patient     []  EMR     []  one family member/friend    []  multiple family members/friends    []  other:      INTERVAL HISTORY 5/9/2023:  He is seen again today in the office for follow up of a previous issue (as above). Since being seen last, the patient is doing better overall, but reports that his pain is about the same, although reports that he has not had any sharp pain since his last visit. At today's visit, he is using a removable brace.      History is obtained today from:   [x]  the patient     []  EMR     []  one family member/friend    []  multiple family members/friends    []  other:      INTERVAL HISTORY

## 2023-08-17 ENCOUNTER — HOSPITAL ENCOUNTER (OUTPATIENT)
Dept: PHYSICAL THERAPY | Facility: CLINIC | Age: 38
Setting detail: THERAPIES SERIES
Discharge: HOME | End: 2023-08-17
Payer: COMMERCIAL

## 2023-08-17 PROCEDURE — 97110 THERAPEUTIC EXERCISES: CPT

## 2023-08-17 NOTE — FLOWSHEET NOTE
[x] [facility]  Outpatient Rehabilitation &  Therapy  [address]   [address]   [place]: [phone]  F: [phone]        Physical Therapy Daily Treatment Note      Date:  2023  Patient Name:  Rosalva Soria  :  1985  MRN: [id number]  Physician: Krystle Yates MD                                        Insurance: BCBS (No copay, 30v Hard Max)  Medical Diagnosis: S82.899D (ICD-10-CM) - Fracture of ankle, closed, unspecified laterality, with routine healing, subsequent encounter                        Rehab Codes: M62.81 (Muscle Weakness), M62.9 (Disorder of Muscle), M79.1 (Myalgia), Z73.6  Onset date: 2023                                   Next Dr's appt. : 23     Visit# / total visits:                                   Cancels/No Shows: 0/0       Subjective:    Pain:  [] Yes  [x] No Location: LLE ankle   Pain Rating: (0-10 scale) 0/10  Pain altered Tx:  [x] No  [] Yes  Action:  Comments: Patient arrived stating he saw Dr. Salvador Abreu and he was instructed to perform all activities without his ASO brace donned. No issues reported today. 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          Objective:  Precautions: WBAT  Exercise     Access Code: [email or web address]     LLE Ankle s/p fibula fracture  Reps/ Time Weight/ Level Comments             Elliptical   10'             Standing HS stretch  3x30\"       Calf stretch off step  3x30\"   HEP             SLS Rebounder 3-way  x20  Foam    bilat   Balance board  L4x5'       Step-up to march  x20  BOSU     Step downs  x20  6\"     Lateral heel taps   x20  6\"    Lunges  2x10  BOSU     Reverse lunges+toe raise  2x10       4 way hip   x20  Blue   Bilat    Total Gym SL Squat  2x10  L20    Total Gym heel raises   2x10  L20     Cones  x3  Foam Bilat    Monster walks  2L  Blue  Around ankle    Monster walk lateral  2L   Blue  Around forefoot          Ladder drills x2     BOSU squat 2x10

## 2023-08-24 ENCOUNTER — HOSPITAL ENCOUNTER (OUTPATIENT)
Dept: PHYSICAL THERAPY | Facility: CLINIC | Age: 38
Setting detail: THERAPIES SERIES
Discharge: HOME | End: 2023-08-24
Payer: COMMERCIAL

## 2023-08-24 PROCEDURE — 97110 THERAPEUTIC EXERCISES: CPT

## 2023-08-24 NOTE — CARE COORDINATION
[] [facility] - [facility] &  Therapy  [address]  [phone]  F: [phone] [] [address] [facility]  [address]   [address]  P: [phone]  F: [phone] [] [address]  [address]  P: [phone]  F: [phone] [x] [address] [facility]  [address]   [address]   [place]: [phone]  F: [phone]     THERAPY RESPONSIBILITY OF CARE TRANSFER FORM       PATIENT NAME: Lacey Whelan  MRN: [id number]   : 1985      TRANSFERRING FACILITY:    [] [facility]   [] [facility]   []  [facility]   [] [facility]   [] [facility] [x] [facility]   [] [facility]  [] [place]   [] Other:       ACCEPTING FACILITY   [x] [facility]   [] [facility]   []  [facility]   [] [place] [facility]   [] [facility] [] [facility]   [] [facility]  [] [place]   [] Other:          REASON FOR TRANSFER: Running assessment and treatment         I am transferring the care of the above patient to: Nguyen Newman, ORAL Diaz, PT  2023      ACCEPTANCE OF CARE:     I am accepting the care of the above patient.  Nguyen Newman, PT

## 2023-08-24 NOTE — FLOWSHEET NOTE
[x] [facility]  Outpatient Rehabilitation &  Therapy  [address]   [address]   [place]: [phone]  F: [phone]        Physical Therapy Daily Treatment Note      Date:  2023  Patient Name:  Madison Aguirre  :  1985  MRN: [id number]  Physician: Srinivas Uribe MD                                        Insurance: [employer] (No copay, 30v Hard Max)  Medical Diagnosis: S82.899D (ICD-10-CM) - Fracture of ankle, closed, unspecified laterality, with routine healing, subsequent encounter                        Rehab Codes: M62.81 (Muscle Weakness), M62.9 (Disorder of Muscle), M79.1 (Myalgia), Z73.6  Onset date: 2023                                   Next 's appt. :     Visit# / total visits:                                   Cancels/No Shows: 0/0       Subjective:    Pain:  [] Yes  [x] No Location: LLE ankle   Pain Rating: (0-10 scale) 0/10  Pain altered Tx:  [x] No  [] Yes  Action:  Comments: Patient arrived stating he was sore from last visit. Has been wearing his ASO during [employer] dancing because he is doing a lot of turning and twisting with foot work but he plans on getting rid of it in a couple of weeks.            Objective:  Precautions: WBAT  Exercise     Access Code: [email or web address]     LLE Ankle s/p fibula fracture  Reps/ Time Weight/ Level Comments             Elliptical   10'             Standing HS stretch  3x30\"       Calf stretch off step  3x30\"   HEP             SLS Rebounder 3-way  x20  Foam    bilat   Balance board  L4x5'       Step-up to march  x20  BOSU     Step downs  x20  6\"     Lateral heel taps   x20  6\"    Lunges  2x10  BOSU     Reverse lunges+toe raise  2x10       4 way hip   x20  Blue   Bilat    Total Gym SL Squat  2x10  L20    Total Gym heel raises   2x10  L20     Cones  x3  Foam Bilat    Monster walks  2L  Blue  Around ankle    Monster walk lateral  2L   Blue  Around forefoot          Ladder drills x2     BOSU squat 2x10     Trampoline 3-way 1x30\"

## 2023-08-31 ENCOUNTER — APPOINTMENT (OUTPATIENT)
Dept: PHYSICAL THERAPY | Facility: CLINIC | Age: 38
End: 2023-08-31
Payer: COMMERCIAL

## 2023-09-01 ENCOUNTER — HOSPITAL ENCOUNTER (OUTPATIENT)
Dept: PHYSICAL THERAPY | Facility: CLINIC | Age: 38
Setting detail: THERAPIES SERIES
Discharge: HOME | End: 2023-09-01
Payer: COMMERCIAL

## 2023-09-01 PROCEDURE — 97110 THERAPEUTIC EXERCISES: CPT

## 2023-09-01 NOTE — FLOWSHEET NOTE
[x] [facility] [place] [facility]\""  Outpatient Rehabilitation &  Therapy  [address]   [address]   [place]: [phone]  F: [phone]        Physical Therapy Daily Treatment Note      Date:  2023  Patient Name:  Adriana Mcakenzie  :  1985  MRN: [id number]  Physician: Salazar Davis MD                                        Insurance: BCBS (No copay, 30v Hard Max)  Medical Diagnosis: S82.899D (ICD-10-CM) - Fracture of ankle, closed, unspecified laterality, with routine healing, subsequent encounter                        Rehab Codes: M62.81 (Muscle Weakness), M62.9 (Disorder of Muscle), M79.1 (Myalgia), Z73.6  Onset date: 2023                                   Next 's appt.:   Visit# / total visits:                                 Cancels/No Shows: 0/0       Subjective:    Pain:  [] Yes  [x] No Location: LLE ankle   Pain Rating: (0-10 scale) 0/10  Pain altered Tx:  [x] No  [] Yes  Action:  Comments: Goals are to run 2 x 1/2 marathons and 5k in November. Goal for the 1/2 marathon next Fall. Typical weekly mileage is 25 miles max with up to a 16 mile long run. A light week is 9 miles [id number]). Speedwork on Tuesday. Tempo on Thursday. Last time he ran was last Fall. Less reserved/cautious with activity. Describes himself with a heel striker. Enters today with what are best described as water shoes. No cushioning, no support, just a sole for protection and thus not adequate for running. He demonstrates over pronation with standing position. He also has a pair of Torins but are at a friend's house.         Objective:  Precautions: WBAT  Exercise     Access Code: [email or web address]     LLE Ankle s/p fibula fracture  Reps/ Time Weight/ Level Completed Comments   Step downs  2x10 8\" x Posterior and lateral   Heel taps 2x10 6\" x    4 way hip   x20  Blue    Bilat    Monster walk lateral  2x15'  black x Feet; issued black TB                        Mat based        1 legged bridges 10  x

## 2023-09-07 ENCOUNTER — HOSPITAL ENCOUNTER (OUTPATIENT)
Dept: PHYSICAL THERAPY | Facility: CLINIC | Age: 38
Setting detail: THERAPIES SERIES
Discharge: HOME | End: 2023-09-07
Payer: COMMERCIAL

## 2023-09-07 PROCEDURE — 97530 THERAPEUTIC ACTIVITIES: CPT

## 2023-09-07 PROCEDURE — 97750 PHYSICAL PERFORMANCE TEST: CPT

## 2023-09-07 PROCEDURE — 97112 NEUROMUSCULAR REEDUCATION: CPT

## 2023-09-07 NOTE — FLOWSHEET NOTE
[x] [facility]  Outpatient Rehabilitation &  Therapy  [address]   [address]   [place]: [phone]  F: [phone]        Physical Therapy Daily Treatment Note      Date:  2023  Patient Name:  Katarina Cruz  :  1985  MRN: [id number]  Physician: Inder Coon MD                         Insurance: BCBS (No copay, 30v Hard Max)  Medical Diagnosis: S82.899D (ICD-10-CM) - Fracture of ankle, closed, unspecified laterality, with routine healing, subsequent encounter                        Rehab Codes: M62.81 (Muscle Weakness), M62.9 (Disorder of Muscle), M79.1 (Myalgia), Z73.6  Onset date: 2023                                    Next 's appt.:   Visit# / total visits:                                  Cancels/No Shows: 0/0       Subjective:    Pain:  [] Yes  [x] No Location: LLE ankle   Pain Rating: (0-10 scale) 0/10  Pain altered Tx:  [x] No  [] Yes  Action:  Comments: enters today with his Altjeremiah Tornorman. Agrees to initiate a return to run program.      Goals are to run 2 x 1/2 marathons and 5k in November. Goal for the 1/2 marathon next Fall. Typical weekly mileage is 25 miles max with up to a 16 mile long run. A light week is 9 miles [id number]). Speedwork on Tuesday. Tempo on Thursday. Last time he ran was last Fall. Less reserved/cautious with activity. Describes himself with a heel striker. Enters today with what are best described as water shoes. No cushioning, no support, just a sole for protection and thus not adequate for running. He demonstrates over pronation with standing position. He also has a pair of Torins but are at a friend's house.         Objective:  Precautions: WBAT  Exercise     Access Code: [email or web address]     LLE Ankle s/p fibula fracture  Reps/ Time Weight/ Level Completed Comments   Step downs  2x10 8\"  Posterior and lateral   Heel taps 2x10 6\"     4 way hip   x20  Blue    Bilat    Monster walk lateral  2x15'  black  Feet; issued

## 2023-09-14 ENCOUNTER — HOSPITAL ENCOUNTER (OUTPATIENT)
Dept: PHYSICAL THERAPY | Facility: CLINIC | Age: 38
Setting detail: THERAPIES SERIES
Discharge: HOME | End: 2023-09-14
Payer: COMMERCIAL

## 2023-09-14 NOTE — FLOWSHEET NOTE
[] [facility] ([facility]) - [facility] &  Therapy  [address] [facility] [place] [facility].    [phone]  F: [phone]   [] [address]  [address]   [address]  P: [phone]  F: [phone]  [] [address]  [address]  P: [phone]  F: [phone] [x] Marvel Núñez  P: [phone]  F: [phone]  [] [address] [facility]   [address]   [place]: [phone]  F: [phone]   [] [address] [facility]  [address]  [place]: [phone]   F: [phone]     Physical Therapy Cancel/No Show note    Date: 2023  Patient: Delmi Leon  : 1985  MRN: [id number]    Cancels/No Shows to date:     For today's appointment patient:    [x]  Cancelled    [] Rescheduled appointment    [] No-show     Reason given by patient:    [x]  Patient ill    []  Conflicting appointment    [] No transportation      [] Conflict with work    [] No reason given    [] Weather related    [] [id number]    [] Other:      Comments:        [x] Next appointment was confirmed    Electronically signed by: Juanita Monteiro PT

## 2023-09-21 ENCOUNTER — HOSPITAL ENCOUNTER (OUTPATIENT)
Dept: PHYSICAL THERAPY | Facility: CLINIC | Age: 38
Setting detail: THERAPIES SERIES
Discharge: HOME | End: 2023-09-21
Payer: COMMERCIAL

## 2023-09-21 PROCEDURE — 97110 THERAPEUTIC EXERCISES: CPT

## 2023-09-21 PROCEDURE — 97112 NEUROMUSCULAR REEDUCATION: CPT

## 2023-09-21 NOTE — FLOWSHEET NOTE
Patient to demonstrate ability to complete dynamic mobility and plyometric movements without pain []  []  []                        Patient goals: \"walk normally, run\"      Pt. Education:  [x] Yes  [] No  [x] Reviewed Prior HEP/Ed,   Method of Education: [] Verbal  [x] Demo  [x] Written  Access Code: [email or web address]  URL: [email or web address]  Date: 09/21/2023  Prepared by: Mayra Castelan    Program Notes  4 runs per levelwalk pace is 3.0 mphrun pace start at 4.6 mph and adjust as needed, but keep easy pace. 3' walk/2' run x 62' walk/3' run x 6 1' walk/4' run x 630 min consecutive    Exercises  - Single Leg Bridge  - 2 x daily - 5 x weekly - 2 sets - 10 reps  - Modified Side Plank with Hip Abduction  - 1 x daily - 5 x weekly - 2 sets - 10 reps  - Standing Hip Abduction with Anchored Resistance  - 1 x daily - 5 x weekly - 2 sets - 10 reps  - Standing Hip Extension with Anchored Resistance  - 1 x daily - 5 x weekly - 2 sets - 10 reps  - Standing Repeated Hip Flexion with Resistance  - 1 x daily - 5 x weekly - 2 sets - 10 reps  - Forward Step Down  - 1 x daily - 5 x weekly - 2 sets - 10 reps - 30 (sec) hold  - Lateral Step Down  - 1 x daily - 5 x weekly - 2 sets - 10-15 reps  - Step Downs  - 1 x daily - 5 x weekly - 2 sets - 10-15 reps  - Side Stepping with Resistance at Feet  - 1 x daily - 5 x weekly - 4 sets - 15 reps  - Calf stretch  - 2 x daily - 7 x weekly - 1 sets - 3 reps - 30 sec hold  - calf stretch w/ towel under big toe  - 2 x daily - 7 x weekly - 1 sets - 3 reps - 30 second hold  - Split Squat Lunge  - 2 x daily - 5 x weekly - 2 sets - 10 reps  - Single Leg Deadlift with Kettlebell  - 1 x daily - 5 x weekly - 2 sets - 6 reps  - Lateral Single Leg Lunge Jumps  - 1 x daily - 5 x weekly - 1 sets - 20 reps      Comprehension of Education:  [x] Verbalizes understanding. [x] Demonstrates understanding. [] Needs review. [x] Demonstrates/verbalizes HEP/Ed previously given.      Plan: [x]   Continue PT see in 4

## 2023-10-08 DIAGNOSIS — I10 PRIMARY HYPERTENSION: ICD-10-CM

## 2023-10-09 RX ORDER — HYDROCHLOROTHIAZIDE 25 MG/1
TABLET ORAL
Qty: 90 TABLET | Refills: 3 | Status: SHIPPED | OUTPATIENT
Start: 2023-10-09

## 2023-10-19 ENCOUNTER — HOSPITAL ENCOUNTER (OUTPATIENT)
Dept: PHYSICAL THERAPY | Facility: CLINIC | Age: 38
Setting detail: THERAPIES SERIES
Discharge: HOME | End: 2023-10-19
Payer: COMMERCIAL

## 2023-10-19 PROCEDURE — 97530 THERAPEUTIC ACTIVITIES: CPT

## 2023-10-19 NOTE — FLOWSHEET NOTE
[x]  [facility]  Outpatient Rehabilitation &  Therapy  [address]  P: [phone]  F: [phone]       Physical Therapy Daily Treatment Note    Date:  10/19/2023  Patient Name:  Kelle Moe  :  1985  MRN: [id number]  Physician: Mike Deluna MD                         Insurance: BCBS (No copay, 30v Hard Max)  Medical Diagnosis: S82.899D (ICD-10-CM) - Fracture of ankle, closed, unspecified laterality, with routine healing, subsequent encounter                        Rehab Codes: M62.81 (Muscle Weakness), M62.9 (Disorder of Muscle), M79.1 (Myalgia), Z73.6  Onset date: 2023                                    Next 's appt.:   Visit# / total visits:                                  Cancels/No Shows: 1/0       Subjective:    Pain:  [] Yes  [x] No Location: LLE ankle   Pain Rating: (0-10 scale) 0/10  Pain altered Tx:  [x] No  [] Yes  Action:  Comments: 3 x 30 min runs :11:42 at 169 spm; 11:59 at 172, 12:22 at 166 spm.  Purchased a XAware footpod. He will do a 5k in [address] on . 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 He will return to full running in the Spring at the end of March/Beg of April. Goals are to run 2 x 1/2 marathons and 5k in November. Goal for the 1/2 marathon next Fall. Typical weekly mileage is 25 miles max with up to a 16 mile long run. A light week is 9 miles [id number]). Speedwork on Tuesday. Tempo on Thursday. Last time he ran was last Fall.      Objective:  Precautions: WBAT  Exercise     Access Code: [email or web address]     LLE Ankle s/p fibula fracture  Reps/ Time Weight/ Level Completed Comments   Step downs  2x10 8\"  Posterior and lateral   Heel taps 2x10 6\"     4 way hip   x20  Blue    Bilat    Monster walk lateral  2x15'  black  Feet; issued new black TB   Split squats 2x15      Split squat hops 10      Lateral step downs 2xfatigue 6\"     Skater jumps 20      L dead lift to [place] press 2x10 10

## 2023-10-26 ENCOUNTER — OFFICE VISIT (OUTPATIENT)
Dept: PRIMARY CARE CLINIC | Age: 38
End: 2023-10-26
Payer: COMMERCIAL

## 2023-10-26 VITALS
HEART RATE: 74 BPM | WEIGHT: 244 LBS | OXYGEN SATURATION: 98 % | DIASTOLIC BLOOD PRESSURE: 78 MMHG | SYSTOLIC BLOOD PRESSURE: 135 MMHG | HEIGHT: 70 IN | BODY MASS INDEX: 34.93 KG/M2 | TEMPERATURE: 97.9 F

## 2023-10-26 DIAGNOSIS — J40 BRONCHITIS: Primary | ICD-10-CM

## 2023-10-26 DIAGNOSIS — J01.90 ACUTE BACTERIAL SINUSITIS: ICD-10-CM

## 2023-10-26 DIAGNOSIS — B96.89 ACUTE BACTERIAL SINUSITIS: ICD-10-CM

## 2023-10-26 PROCEDURE — 3078F DIAST BP <80 MM HG: CPT | Performed by: NURSE PRACTITIONER

## 2023-10-26 PROCEDURE — 3075F SYST BP GE 130 - 139MM HG: CPT | Performed by: NURSE PRACTITIONER

## 2023-10-26 PROCEDURE — 99213 OFFICE O/P EST LOW 20 MIN: CPT | Performed by: NURSE PRACTITIONER

## 2023-10-26 RX ORDER — PREDNISONE 20 MG/1
40 TABLET ORAL DAILY
Qty: 10 TABLET | Refills: 0 | Status: SHIPPED | OUTPATIENT
Start: 2023-10-26 | End: 2023-10-31

## 2023-10-26 RX ORDER — AMOXICILLIN AND CLAVULANATE POTASSIUM 875; 125 MG/1; MG/1
1 TABLET, FILM COATED ORAL 2 TIMES DAILY
Qty: 20 TABLET | Refills: 0 | Status: SHIPPED | OUTPATIENT
Start: 2023-10-26 | End: 2023-11-05

## 2023-10-26 RX ORDER — ALBUTEROL SULFATE 90 UG/1
2 AEROSOL, METERED RESPIRATORY (INHALATION) 4 TIMES DAILY PRN
Qty: 54 G | Refills: 0 | Status: SHIPPED | OUTPATIENT
Start: 2023-10-26

## 2023-10-26 ASSESSMENT — ENCOUNTER SYMPTOMS
SORE THROAT: 1
SHORTNESS OF BREATH: 1
SINUS PRESSURE: 1
EYE REDNESS: 0
CHEST TIGHTNESS: 0
VOICE CHANGE: 0
EYE DISCHARGE: 0
COUGH: 1
WHEEZING: 1

## 2023-10-26 NOTE — PROGRESS NOTES
ethyl esters (LOVAZA) 1 g capsule TAKE TWO CAPSULES BY MOUTH TWICE A DAY 60 capsule 3    ibuprofen (ADVIL;MOTRIN) 200 MG tablet Take 1 tablet by mouth every 6 hours as needed for Pain      Multiple Vitamins-Minerals (MENS MULTI VITAMIN & MINERAL PO) Take by mouth      aspirin 325 MG EC tablet Take 1 tablet by mouth daily 42 tablet 0    Omega-3 Fatty Acids (FISH OIL) 1000 MG CAPS Take 3 capsules by mouth 3 times daily       No current facility-administered medications for this visit. No Known Allergies    Reviewed PMH, SH, and  with the patient and updated. Subjective:      Review of Systems   Constitutional:  Negative for chills, fatigue and fever. HENT:  Positive for congestion, postnasal drip, sinus pressure and sore throat. Negative for ear discharge, ear pain, sneezing and voice change. Eyes:  Negative for discharge and redness. Respiratory:  Positive for cough, shortness of breath and wheezing. Negative for chest tightness. Cardiovascular: Negative. Negative for chest pain. Musculoskeletal:  Negative for myalgias. Skin:  Negative for rash. Neurological:  Positive for headaches. Negative for dizziness, weakness and light-headedness. Hematological:  Negative for adenopathy. All other systems reviewed and are negative. Objective:      Physical Exam  Vitals and nursing note reviewed. Constitutional:       General: He is not in acute distress. Appearance: Normal appearance. He is well-developed. He is not ill-appearing, toxic-appearing or diaphoretic. HENT:      Head: Normocephalic. Right Ear: Tympanic membrane and external ear normal.      Left Ear: Tympanic membrane and external ear normal.      Nose: Nose normal.      Right Sinus: No maxillary sinus tenderness or frontal sinus tenderness. Left Sinus: No maxillary sinus tenderness or frontal sinus tenderness. Mouth/Throat:      Pharynx: Oropharyngeal exudate (PND) and posterior oropharyngeal erythema present.

## 2023-11-04 DIAGNOSIS — F41.9 ANXIETY: ICD-10-CM

## 2023-11-06 RX ORDER — SERTRALINE HYDROCHLORIDE 100 MG/1
TABLET, FILM COATED ORAL
Qty: 135 TABLET | Refills: 0 | Status: SHIPPED | OUTPATIENT
Start: 2023-11-06

## 2023-11-15 DIAGNOSIS — S82.892G CLOSED FRACTURE OF LEFT ANKLE WITH DELAYED HEALING, SUBSEQUENT ENCOUNTER: Primary | ICD-10-CM

## 2023-11-16 ENCOUNTER — OFFICE VISIT (OUTPATIENT)
Dept: ORTHOPEDIC SURGERY | Age: 38
End: 2023-11-16
Payer: COMMERCIAL

## 2023-11-16 VITALS — RESPIRATION RATE: 15 BRPM | HEIGHT: 70 IN | WEIGHT: 244 LBS | BODY MASS INDEX: 34.93 KG/M2 | OXYGEN SATURATION: 100 %

## 2023-11-16 DIAGNOSIS — S82.892G CLOSED FRACTURE OF LEFT ANKLE WITH DELAYED HEALING, SUBSEQUENT ENCOUNTER: Primary | ICD-10-CM

## 2023-11-16 PROCEDURE — 99212 OFFICE O/P EST SF 10 MIN: CPT | Performed by: ORTHOPAEDIC SURGERY

## 2023-11-16 NOTE — PROGRESS NOTES
[address] [facility] SPORTS MEDICINE  [address] [facility]  [address] [place] [address]  Dept: [phone]    Ambulatory Orthopedic Consult      CHIEF COMPLAINT:    Chief Complaint   Patient presents with    Ankle Pain     Left         HISTORY OF PRESENT ILLNESS:      The patient is a 34 y.o. male who is being seen for evaluation of the above, which began 2/19/2023 secondary to a snowboarding injury  . At today's visit, he is using a brace/boot. History is obtained today from:   [x]  the patient     [x]  EMR     []  one family member/friend    []  multiple family members/friends    []  other:      INTERVAL HISTORY 2/23/2023:  He is seen again today in the office for follow up of imaging as below. Since being seen last, the patient is doing about the same overall. At today's visit, he is using a brace/boot and crutches. History is obtained today from:   [x]  the patient     [x]  EMR     []  one family member/friend    []  multiple family members/friends    []  other:      INTERVAL HISTORY 3/28/2023:  He is seen again today in the office for follow up of a previous issue (as above). Since being seen last, the patient is doing better. At today's visit, he is using a removable brace. History is obtained today from:   [x]  the patient     []  EMR     []  one family member/friend    []  multiple family members/friends    []  other:      INTERVAL HISTORY 5/9/2023:  He is seen again today in the office for follow up of a previous issue (as above). Since being seen last, the patient is doing better overall, but reports that his pain is about the same, although reports that he has not had any sharp pain since his last visit. At today's visit, he is using a removable brace.      History is obtained today from:   [x]  the patient     []  EMR     []  one family member/friend    []  multiple family members/friends    []  other:      INTERVAL HISTORY

## 2023-11-22 ENCOUNTER — OFFICE VISIT (OUTPATIENT)
Dept: PRIMARY CARE CLINIC | Age: 38
End: 2023-11-22
Payer: COMMERCIAL

## 2023-11-22 VITALS
HEART RATE: 73 BPM | DIASTOLIC BLOOD PRESSURE: 93 MMHG | TEMPERATURE: 97.5 F | OXYGEN SATURATION: 99 % | SYSTOLIC BLOOD PRESSURE: 135 MMHG

## 2023-11-22 DIAGNOSIS — J40 BRONCHITIS: Primary | ICD-10-CM

## 2023-11-22 PROCEDURE — 3080F DIAST BP >= 90 MM HG: CPT | Performed by: NURSE PRACTITIONER

## 2023-11-22 PROCEDURE — 99213 OFFICE O/P EST LOW 20 MIN: CPT | Performed by: NURSE PRACTITIONER

## 2023-11-22 PROCEDURE — 3075F SYST BP GE 130 - 139MM HG: CPT | Performed by: NURSE PRACTITIONER

## 2023-11-22 RX ORDER — AZITHROMYCIN 250 MG/1
TABLET, FILM COATED ORAL
Qty: 1 PACKET | Refills: 0 | Status: SHIPPED | OUTPATIENT
Start: 2023-11-22

## 2023-11-22 RX ORDER — PREDNISONE 20 MG/1
40 TABLET ORAL DAILY
Qty: 10 TABLET | Refills: 0 | Status: SHIPPED | OUTPATIENT
Start: 2023-11-22 | End: 2023-11-27

## 2023-11-22 RX ORDER — ALBUTEROL SULFATE 90 UG/1
2 AEROSOL, METERED RESPIRATORY (INHALATION) 4 TIMES DAILY PRN
Qty: 54 G | Refills: 1 | Status: SHIPPED | OUTPATIENT
Start: 2023-11-22

## 2023-11-22 RX ORDER — BUDESONIDE, GLYCOPYRROLATE, AND FORMOTEROL FUMARATE 160; 9; 4.8 UG/1; UG/1; UG/1
2 AEROSOL, METERED RESPIRATORY (INHALATION) 2 TIMES DAILY
Qty: 10.7 G | Refills: 0
Start: 2023-11-22 | End: 2023-12-22

## 2023-11-22 ASSESSMENT — ENCOUNTER SYMPTOMS
EYE DISCHARGE: 0
EYE REDNESS: 0
CHEST TIGHTNESS: 0
COUGH: 1
VOICE CHANGE: 0
WHEEZING: 1
SORE THROAT: 0
SINUS PRESSURE: 0
SHORTNESS OF BREATH: 1

## 2023-11-22 NOTE — PROGRESS NOTES
[address] [facility] WALK IN CARE  [address]  [address] [place] [address]  Dept: [phone]  Dept Fax: [phone]     Nicky Mclean is a 34 y.o. male who presents to the urgent care today for his medicalconditions/complaints as noted below. Nicky Mclean is c/o of Cough (Productive cough, discolored mucus, wheezing, x 1 week )    HPI:      Cough  This is a recurrent problem. The current episode started in the past 7 days. The problem has been gradually worsening. The cough is Productive of purulent sputum. Associated symptoms include postnasal drip, shortness of breath and wheezing. Pertinent negatives include no chest pain, chills, ear pain, eye redness, fever, headaches, myalgias, nasal congestion, rash or sore throat. Treatments tried: albuterol. The treatment provided mild relief. His past medical history is significant for bronchitis. Has been outdoors running more often lately, as part of his recovery for his left ankle fracture. He states that he believes being outdoors in the elements seems to contribute to his symptoms. Requesting a letter of necessity for an indoor treadmill at this time.      Past Medical History:   Diagnosis Date    ADHD (attention deficit hyperactivity disorder) 2014    Anxiety     Bulging lumbar disc     Chronic dryness of both eyes     Hyperlipidemia     Hypertension       Current Outpatient Medications   Medication Sig Dispense Refill    Budeson-Glycopyrrol-Formoterol (BREZTRI AEROSPHERE) 160-9-4.8 MCG/ACT AERO Inhale 2 Inhalations into the lungs in the morning and at bedtime 10.7 g 0    predniSONE (DELTASONE) 20 MG tablet Take 2 tablets by mouth daily for 5 days 10 tablet 0    azithromycin (ZITHROMAX Z-JOSLYN) 250 MG tablet Take 2 tabs on day 1 followed by 1 tab on days 2-5. 1 packet 0    albuterol sulfate HFA (VENTOLIN HFA) 108 (90 Base) MCG/ACT inhaler Inhale 2 puffs into the lungs 4 times daily as needed for Wheezing

## 2023-12-05 NOTE — DISCHARGE SUMMARY
[x] [facility]  Outpatient Rehabilitation &  Therapy  [address] [facility]  P: [phone]  F: [phone]     Physical Therapy Discharge Note    Date: 2023      Patient: Celso Soriano   : 1985  MRN: [id number]   Physician: Roby Cedeno MD             Insurance: BCBS (No copay, 30v Hard Max)  Medical Diagnosis: S82.899D (ICD-10-CM) - Fracture of ankle, closed, unspecified laterality, with routine healing, subsequent encounter                        Rehab Codes: M62.81 (Muscle Weakness), M62.9 (Disorder of Muscle), M79.1 (Myalgia), Z73.6  Onset date: 2023                            Next 's appt.:   Visit# / total visits:                            Cancels/No Shows:    Date of initial visit: 23                 Date of final visit: 10/19/23    Assessment:   STG/LTG:  Goals  MET NOT MET ON-  GOING  Details   Date Addressed:           STG: To be met in 10 treatments            1. ? Pain: Decrease pain levels of the LLE ankle to 2/10 with ADLs [x]  []  []      2. ? ROM: Improve LLE ankle DF to 15 deg, PF to 60 deg, inversion to 30, and eversion to 18 to ease gait and achieve normalized gait [x]  []  []      3. ? Strength: Increase MMT to 5/5 throughout to ease functional limitations and mobility  [x]  []  []      4. Independent with Home Exercise Programs [x]  []  []      5. Patient to demonstrate ability to ambulate with normalized gait and step pattern, with correct heel-to-toe motion [x]  []  []                  Date Addressed:            LTG: To be met in 20 treatments           1. Improve score on assessment tool FAAM from 46% impairment to less than 20% impairment  []  []  []      2. Reduce pain levels of the LLE ankle to 1/10 or less with ADLs [x]  []  []      3. Patient to demonstrate ability to complete dynamic mobility and plyometric movements without pain [x]  []  []                        Patient goals: \"walk normally, run\"    Treatment to Date:  [x]

## 2023-12-12 ENCOUNTER — HOSPITAL ENCOUNTER (OUTPATIENT)
Age: 38
Setting detail: SPECIMEN
Discharge: HOME | End: 2023-12-12

## 2023-12-12 DIAGNOSIS — Z00.00 ENCOUNTER FOR WELL ADULT EXAM WITHOUT ABNORMAL FINDINGS: ICD-10-CM

## 2023-12-12 DIAGNOSIS — E78.1 HYPERTRIGLYCERIDEMIA: ICD-10-CM

## 2023-12-12 DIAGNOSIS — I10 ESSENTIAL HYPERTENSION: ICD-10-CM

## 2023-12-12 LAB
ALBUMIN SERPL-MCNC: 4.4 G/DL (ref 3.5–5.2)
ALBUMIN/GLOB SERPL: 1.7 {RATIO} (ref 1–2.5)
ALP SERPL-CCNC: 41 U/L (ref 40–129)
ALT SERPL-CCNC: 23 U/L (ref 5–41)
ANION GAP SERPL CALCULATED.3IONS-SCNC: 11 MMOL/L (ref 9–17)
AST SERPL-CCNC: 25 U/L
BASOPHILS # BLD: <0.03 K/UL (ref 0–0.2)
BASOPHILS NFR BLD: 0 % (ref 0–2)
BILIRUB SERPL-MCNC: 0.4 MG/DL (ref 0.3–1.2)
BUN SERPL-MCNC: 16 MG/DL (ref 6–20)
CALCIUM SERPL-MCNC: 9.4 MG/DL (ref 8.6–10.4)
CHLORIDE SERPL-SCNC: 105 MMOL/L (ref 98–107)
CHOLEST SERPL-MCNC: 160 MG/DL
CHOLESTEROL/HDL RATIO: 3.4
CO2 SERPL-SCNC: 23 MMOL/L (ref 20–31)
CREAT SERPL-MCNC: 1.1 MG/DL (ref 0.7–1.2)
EOSINOPHIL # BLD: 0.05 K/UL (ref 0–0.44)
EOSINOPHILS RELATIVE PERCENT: 1 % (ref 1–4)
ERYTHROCYTE [DISTWIDTH] IN BLOOD BY AUTOMATED COUNT: 12.3 % (ref 11.8–14.4)
GFR SERPL CREATININE-BSD FRML MDRD: >60 ML/MIN/1.73M2
GLUCOSE SERPL-MCNC: 75 MG/DL (ref 70–99)
HCT VFR BLD AUTO: 46.1 % (ref 40.7–50.3)
HDLC SERPL-MCNC: 47 MG/DL
HGB BLD-MCNC: 14.9 G/DL (ref 13–17)
IMM GRANULOCYTES # BLD AUTO: <0.03 K/UL (ref 0–0.3)
IMM GRANULOCYTES NFR BLD: 0 %
LDLC SERPL CALC-MCNC: 96 MG/DL (ref 0–130)
LYMPHOCYTES NFR BLD: 2.18 K/UL (ref 1.1–3.7)
LYMPHOCYTES RELATIVE PERCENT: 47 % (ref 24–43)
MCH RBC QN AUTO: 30.1 PG (ref 25.2–33.5)
MCHC RBC AUTO-ENTMCNC: 32.3 G/DL (ref 28.4–34.8)
MCV RBC AUTO: 93.1 FL (ref 82.6–102.9)
MONOCYTES NFR BLD: 0.38 K/UL (ref 0.1–1.2)
MONOCYTES NFR BLD: 8 % (ref 3–12)
NEUTROPHILS NFR BLD: 44 % (ref 36–65)
NEUTS SEG NFR BLD: 2.07 K/UL (ref 1.5–8.1)
NRBC BLD-RTO: 0 PER 100 WBC
PLATELET # BLD AUTO: 251 K/UL (ref 138–453)
PMV BLD AUTO: 10.2 FL (ref 8.1–13.5)
POTASSIUM SERPL-SCNC: 4.2 MMOL/L (ref 3.7–5.3)
PROT SERPL-MCNC: 7 G/DL (ref 6.4–8.3)
RBC # BLD AUTO: 4.95 M/UL (ref 4.21–5.77)
SODIUM SERPL-SCNC: 139 MMOL/L (ref 135–144)
TRIGL SERPL-MCNC: 84 MG/DL
WBC OTHER # BLD: 4.7 K/UL (ref 3.5–11.3)

## 2023-12-14 ENCOUNTER — OFFICE VISIT (OUTPATIENT)
Dept: FAMILY MEDICINE CLINIC | Age: 38
End: 2023-12-14
Payer: COMMERCIAL

## 2023-12-14 VITALS
TEMPERATURE: 97.2 F | HEART RATE: 70 BPM | DIASTOLIC BLOOD PRESSURE: 84 MMHG | OXYGEN SATURATION: 99 % | WEIGHT: 221 LBS | SYSTOLIC BLOOD PRESSURE: 132 MMHG | BODY MASS INDEX: 31.64 KG/M2 | HEIGHT: 70 IN

## 2023-12-14 DIAGNOSIS — I10 PRIMARY HYPERTENSION: Primary | ICD-10-CM

## 2023-12-14 DIAGNOSIS — Z23 NEED FOR INFLUENZA VACCINATION: ICD-10-CM

## 2023-12-14 PROCEDURE — 90471 IMMUNIZATION ADMIN: CPT | Performed by: PHYSICIAN ASSISTANT

## 2023-12-14 PROCEDURE — 99213 OFFICE O/P EST LOW 20 MIN: CPT | Performed by: PHYSICIAN ASSISTANT

## 2023-12-14 PROCEDURE — 3075F SYST BP GE 130 - 139MM HG: CPT | Performed by: PHYSICIAN ASSISTANT

## 2023-12-14 PROCEDURE — 3079F DIAST BP 80-89 MM HG: CPT | Performed by: PHYSICIAN ASSISTANT

## 2023-12-14 PROCEDURE — 90674 CCIIV4 VAC NO PRSV 0.5 ML IM: CPT | Performed by: PHYSICIAN ASSISTANT

## 2023-12-14 NOTE — PROGRESS NOTES
1000 [facility] WALK-IN FAMILY MEDICINE  [address] [address]  Dept: [phone]  Dept Fax: [phone]    Julee [place]man is a 34 y.o. male who presents today for his medical conditions/complaintsas noted below. Julee Beckman is c/o of   Chief Complaint   Patient presents with    Hypertension         HPI:     Hypertension  This is a chronic problem. The current episode started more than 1 year ago. The problem is unchanged. The problem is controlled. Pertinent negatives include no chest pain, palpitations, peripheral edema or shortness of breath. Past treatments include ACE inhibitors and diuretics. The current treatment provides moderate improvement. There are no compliance problems. Patient states he is doing better and is more active now. Has a treadmill in the house and able to run 2 + times a week. He states tolerates 30 min at a time. No longer following with ortho, states bone is well healed now. Patient planning to run a 1/2 marathon on memorial weekend  Patient states he has not yet taken his HCTZ this morning.  Last dose yesterday AM  Patient is down 25-30 pounds    No results found for: \"LABA1C\"          ( goal A1Cis < 7)   No components found for: \"LABMICR\"  LDL Cholesterol (mg/dL)   Date Value   12/12/2023 96   08/24/2022 113   01/20/2022 154 (H)       (goal LDL is <100)   AST (U/L)   Date Value   12/12/2023 25     ALT (U/L)   Date Value   12/12/2023 23     BUN (mg/dL)   Date Value   12/12/2023 16     BP Readings from Last 3 Encounters:   12/14/23 132/84   11/22/23 (!) 135/93   10/26/23 135/78          (goal 120/80)    Past Medical History:   Diagnosis Date    ADHD (attention deficit hyperactivity disorder) 2014    Anxiety     Bulging lumbar disc     Chronic dryness of both eyes     Hyperlipidemia     Hypertension       Past Surgical History:   Procedure Laterality Date    EYE SURGERY      SEPTOPLASTY      TONSILLECTOMY         Family History
responses:    Have you ever had a reaction to a flu vaccine? No  Are you able to eat eggs without adverse effects? Yes  Do you have any current illness? No  Have you ever had Guillian Blanchester Syndrome? No    Flu vaccine given per order. Please see immunization tab.

## 2024-02-07 DIAGNOSIS — I10 ESSENTIAL HYPERTENSION: ICD-10-CM

## 2024-02-07 RX ORDER — LISINOPRIL 30 MG/1
30 TABLET ORAL DAILY
Qty: 90 TABLET | Refills: 1 | Status: SHIPPED | OUTPATIENT
Start: 2024-02-07

## 2024-02-13 DIAGNOSIS — F41.9 ANXIETY: ICD-10-CM

## 2024-02-13 DIAGNOSIS — E78.1 HYPERTRIGLYCERIDEMIA: ICD-10-CM

## 2024-02-13 RX ORDER — SERTRALINE HYDROCHLORIDE 100 MG/1
TABLET, FILM COATED ORAL
Qty: 135 TABLET | Refills: 0 | Status: SHIPPED | OUTPATIENT
Start: 2024-02-13

## 2024-02-13 RX ORDER — FENOFIBRATE 160 MG/1
160 TABLET ORAL DAILY
Qty: 90 TABLET | Refills: 1 | Status: SHIPPED | OUTPATIENT
Start: 2024-02-13

## 2024-05-13 ENCOUNTER — OFFICE VISIT (OUTPATIENT)
Dept: PRIMARY CARE CLINIC | Age: 39
End: 2024-05-13
Payer: COMMERCIAL

## 2024-05-13 VITALS
SYSTOLIC BLOOD PRESSURE: 129 MMHG | TEMPERATURE: 98 F | DIASTOLIC BLOOD PRESSURE: 96 MMHG | OXYGEN SATURATION: 99 % | HEART RATE: 75 BPM

## 2024-05-13 DIAGNOSIS — J40 BRONCHITIS: Primary | ICD-10-CM

## 2024-05-13 PROCEDURE — 3074F SYST BP LT 130 MM HG: CPT | Performed by: NURSE PRACTITIONER

## 2024-05-13 PROCEDURE — 99213 OFFICE O/P EST LOW 20 MIN: CPT | Performed by: NURSE PRACTITIONER

## 2024-05-13 PROCEDURE — 3080F DIAST BP >= 90 MM HG: CPT | Performed by: NURSE PRACTITIONER

## 2024-05-13 RX ORDER — PREDNISONE 20 MG/1
40 TABLET ORAL DAILY
Qty: 10 TABLET | Refills: 0 | Status: SHIPPED | OUTPATIENT
Start: 2024-05-13 | End: 2024-05-18

## 2024-05-13 RX ORDER — AZITHROMYCIN 250 MG/1
TABLET, FILM COATED ORAL
Qty: 1 PACKET | Refills: 0 | Status: SHIPPED | OUTPATIENT
Start: 2024-05-13

## 2024-05-13 RX ORDER — ALBUTEROL SULFATE 90 UG/1
2 AEROSOL, METERED RESPIRATORY (INHALATION) 4 TIMES DAILY PRN
Qty: 54 G | Refills: 0 | Status: SHIPPED | OUTPATIENT
Start: 2024-05-13

## 2024-05-13 ASSESSMENT — ENCOUNTER SYMPTOMS
SINUS PRESSURE: 0
SORE THROAT: 0
COUGH: 1
CHEST TIGHTNESS: 1
VOICE CHANGE: 0
SHORTNESS OF BREATH: 1
EYE REDNESS: 0
WHEEZING: 1
EYE DISCHARGE: 0

## 2024-05-18 ENCOUNTER — OFFICE VISIT (OUTPATIENT)
Dept: PRIMARY CARE CLINIC | Age: 39
End: 2024-05-18
Payer: COMMERCIAL

## 2024-05-18 VITALS
OXYGEN SATURATION: 96 % | BODY MASS INDEX: 29.78 KG/M2 | TEMPERATURE: 97.7 F | SYSTOLIC BLOOD PRESSURE: 147 MMHG | HEIGHT: 70 IN | WEIGHT: 208 LBS | HEART RATE: 77 BPM | DIASTOLIC BLOOD PRESSURE: 90 MMHG

## 2024-05-18 DIAGNOSIS — R06.2 WHEEZING: Primary | ICD-10-CM

## 2024-05-18 DIAGNOSIS — R06.02 SHORTNESS OF BREATH: ICD-10-CM

## 2024-05-18 DIAGNOSIS — R05.1 ACUTE COUGH: ICD-10-CM

## 2024-05-18 DIAGNOSIS — R09.82 PND (POST-NASAL DRIP): ICD-10-CM

## 2024-05-18 PROCEDURE — 99214 OFFICE O/P EST MOD 30 MIN: CPT | Performed by: NURSE PRACTITIONER

## 2024-05-18 PROCEDURE — 3077F SYST BP >= 140 MM HG: CPT | Performed by: NURSE PRACTITIONER

## 2024-05-18 PROCEDURE — 3080F DIAST BP >= 90 MM HG: CPT | Performed by: NURSE PRACTITIONER

## 2024-05-18 RX ORDER — FLUTICASONE PROPIONATE 110 UG/1
2 AEROSOL, METERED RESPIRATORY (INHALATION) 2 TIMES DAILY
Qty: 12 G | Refills: 0 | Status: SHIPPED | OUTPATIENT
Start: 2024-05-18 | End: 2025-05-18

## 2024-05-18 RX ORDER — BENZONATATE 100 MG/1
100 CAPSULE ORAL 3 TIMES DAILY PRN
Qty: 21 CAPSULE | Refills: 0 | Status: SHIPPED | OUTPATIENT
Start: 2024-05-18 | End: 2024-05-25

## 2024-05-18 RX ORDER — PREDNISONE 20 MG/1
20 TABLET ORAL 2 TIMES DAILY
Qty: 10 TABLET | Refills: 0 | Status: SHIPPED | OUTPATIENT
Start: 2024-05-18 | End: 2024-05-23

## 2024-05-18 SDOH — ECONOMIC STABILITY: FOOD INSECURITY: WITHIN THE PAST 12 MONTHS, YOU WORRIED THAT YOUR FOOD WOULD RUN OUT BEFORE YOU GOT MONEY TO BUY MORE.: NEVER TRUE

## 2024-05-18 SDOH — ECONOMIC STABILITY: FOOD INSECURITY: WITHIN THE PAST 12 MONTHS, THE FOOD YOU BOUGHT JUST DIDN'T LAST AND YOU DIDN'T HAVE MONEY TO GET MORE.: NEVER TRUE

## 2024-05-18 SDOH — ECONOMIC STABILITY: INCOME INSECURITY: HOW HARD IS IT FOR YOU TO PAY FOR THE VERY BASICS LIKE FOOD, HOUSING, MEDICAL CARE, AND HEATING?: NOT HARD AT ALL

## 2024-05-18 ASSESSMENT — PATIENT HEALTH QUESTIONNAIRE - PHQ9
SUM OF ALL RESPONSES TO PHQ QUESTIONS 1-9: 0
2. FEELING DOWN, DEPRESSED OR HOPELESS: NOT AT ALL
SUM OF ALL RESPONSES TO PHQ QUESTIONS 1-9: 0
SUM OF ALL RESPONSES TO PHQ QUESTIONS 1-9: 0
1. LITTLE INTEREST OR PLEASURE IN DOING THINGS: NOT AT ALL
SUM OF ALL RESPONSES TO PHQ QUESTIONS 1-9: 0
SUM OF ALL RESPONSES TO PHQ9 QUESTIONS 1 & 2: 0

## 2024-05-18 ASSESSMENT — ENCOUNTER SYMPTOMS
ABDOMINAL PAIN: 0
SPUTUM PRODUCTION: 0
HEMOPTYSIS: 0
SHORTNESS OF BREATH: 1
DIARRHEA: 0
STRIDOR: 0
COUGH: 1
WHEEZING: 1
SORE THROAT: 0
SWOLLEN GLANDS: 0
VOMITING: 0
APNEA: 0
CHEST TIGHTNESS: 1
CHOKING: 0

## 2024-05-18 NOTE — PROGRESS NOTES
results   Flovent inhaler rx  Will do 2nd round oral steroids  Hortensia perles rx  No evidence secondary bacteria infection at this time  Cont flonase and mucinex, cont alb inhaler prn  Switch benadryl to home zyrtec  Reviewed over-the-counter treatments for symptom management.  Return worse  Pt verbalized agreement and understanding of POC    Return if symptoms worsen or fail to improve, for Make an Appt. with your Primary Care in 1 week.    Orders Placed This Encounter   Medications    fluticasone (FLOVENT HFA) 110 MCG/ACT inhaler     Sig: Inhale 2 puffs into the lungs 2 times daily     Dispense:  12 g     Refill:  0    benzonatate (TESSALON PERLES) 100 MG capsule     Sig: Take 1 capsule by mouth 3 times daily as needed for Cough     Dispense:  21 capsule     Refill:  0    predniSONE (DELTASONE) 20 MG tablet     Sig: Take 1 tablet by mouth 2 times daily for 5 days     Dispense:  10 tablet     Refill:  0         Patient given educational materials - see patient instructions.  Discussed use, benefit, and side effects of prescribed medications.  All patient questions answered.  Pt voicedunderstanding.    Electronically signed by FRANCI Burnett CNP on 5/18/2024 at 2:18 PM

## 2024-06-13 DIAGNOSIS — R06.2 WHEEZING: ICD-10-CM

## 2024-06-13 DIAGNOSIS — R06.02 SHORTNESS OF BREATH: ICD-10-CM

## 2024-06-13 RX ORDER — FLUTICASONE PROPIONATE 110 UG/1
2 AEROSOL, METERED RESPIRATORY (INHALATION) 2 TIMES DAILY
Qty: 12 G | Refills: 0 | OUTPATIENT
Start: 2024-06-13

## 2024-06-17 RX ORDER — OMEGA-3-ACID ETHYL ESTERS 1 G/1
CAPSULE, LIQUID FILLED ORAL
Qty: 360 CAPSULE | Refills: 0 | Status: SHIPPED | OUTPATIENT
Start: 2024-06-17

## 2024-06-20 ENCOUNTER — OFFICE VISIT (OUTPATIENT)
Dept: FAMILY MEDICINE CLINIC | Age: 39
End: 2024-06-20
Payer: COMMERCIAL

## 2024-06-20 VITALS
SYSTOLIC BLOOD PRESSURE: 116 MMHG | HEIGHT: 70 IN | DIASTOLIC BLOOD PRESSURE: 80 MMHG | OXYGEN SATURATION: 99 % | HEART RATE: 71 BPM | WEIGHT: 211 LBS | BODY MASS INDEX: 30.21 KG/M2 | TEMPERATURE: 97 F

## 2024-06-20 DIAGNOSIS — Z11.59 NEED FOR HEPATITIS C SCREENING TEST: ICD-10-CM

## 2024-06-20 DIAGNOSIS — E78.1 HYPERTRIGLYCERIDEMIA: ICD-10-CM

## 2024-06-20 DIAGNOSIS — I10 ESSENTIAL HYPERTENSION: ICD-10-CM

## 2024-06-20 DIAGNOSIS — I10 PRIMARY HYPERTENSION: Primary | ICD-10-CM

## 2024-06-20 DIAGNOSIS — F41.9 ANXIETY: ICD-10-CM

## 2024-06-20 PROCEDURE — 3079F DIAST BP 80-89 MM HG: CPT | Performed by: PHYSICIAN ASSISTANT

## 2024-06-20 PROCEDURE — 3074F SYST BP LT 130 MM HG: CPT | Performed by: PHYSICIAN ASSISTANT

## 2024-06-20 PROCEDURE — 99213 OFFICE O/P EST LOW 20 MIN: CPT | Performed by: PHYSICIAN ASSISTANT

## 2024-06-20 RX ORDER — SERTRALINE HYDROCHLORIDE 100 MG/1
100 TABLET, FILM COATED ORAL DAILY
Qty: 90 TABLET | Refills: 1 | Status: SHIPPED | OUTPATIENT
Start: 2024-06-20

## 2024-06-20 RX ORDER — FENOFIBRATE 160 MG/1
160 TABLET ORAL DAILY
Qty: 90 TABLET | Refills: 1 | Status: SHIPPED | OUTPATIENT
Start: 2024-06-20

## 2024-06-20 RX ORDER — LISINOPRIL 30 MG/1
30 TABLET ORAL DAILY
Qty: 90 TABLET | Refills: 1 | Status: SHIPPED | OUTPATIENT
Start: 2024-06-20

## 2024-06-20 ASSESSMENT — ENCOUNTER SYMPTOMS
WHEEZING: 0
COUGH: 0
SHORTNESS OF BREATH: 0
COLOR CHANGE: 0
ABDOMINAL PAIN: 0

## 2024-06-20 NOTE — PROGRESS NOTES
Visit Information    Have you changed or started any medications since your last visit including any over-the-counter medicines, vitamins, or herbal medicines? no   Are you having any side effects from any of your medications? -  no  Have you stopped taking any of your medications? Is so, why? -  no    Have you seen any other physician or provider since your last visit? No  Have you had any other diagnostic tests since your last visit? No  Have you been seen in the emergency room and/or had an admission to a hospital since we last saw you? No  Have you had your routine dental cleaning in the past 6 months? no    Have you activated your [employer] account? If not, what are your barriers? Yes     Patient Care Team:  Sandra Martinez PA-C as PCP - General (Family Medicine)  Sandra Martinez PA-C as PCP - Empaneled Provider    Medical History Review  Past Medical, Family, and Social History reviewed and  contribute to the patient presenting condition    Health Maintenance   Topic Date Due    COVID-19 Vaccine (1) Never done    HIV screen  Never done    Hepatitis C screen  Never done    Hepatitis B vaccine (3 of 3 - 3-dose series) 09/29/2005    Varicella vaccine (1 of 2 - 2-dose childhood series) Never done    Depression Screen  05/18/2025    DTaP/Tdap/Td vaccine (4 - Td or Tdap) 10/28/2028    Lipids  12/12/2028    Flu vaccine  Completed    Hepatitis A vaccine  Aged Out    Hib vaccine  Aged Out    HPV vaccine  Aged Out    Polio vaccine  Aged Out    Meningococcal (ACWY) vaccine  Aged Out    Pneumococcal 0-64 years Vaccine  Aged Out       
vaccine  Aged Out    Pneumococcal 0-64 years Vaccine  Aged Out       Subjective:     Review of Systems   Constitutional:  Negative for activity change, appetite change and fatigue.        /80 (Site: Left Upper Arm, Position: Sitting, Cuff Size: Large Adult)   Pulse 71   Temp 97 °F (36.1 °C) (Tympanic)   Ht 1.778 m (5' 10\")   Wt 95.7 kg (211 lb)   SpO2 99%   BMI 30.28 kg/m²    Respiratory:  Negative for cough, shortness of breath and wheezing.    Cardiovascular:  Negative for chest pain, palpitations and leg swelling.   Gastrointestinal:  Negative for abdominal pain.   Skin:  Negative for color change, pallor, rash and wound.   Neurological:  Negative for weakness.   Psychiatric/Behavioral:  Negative for sleep disturbance. The patient is not nervous/anxious.        Objective:     Physical Exam  Vitals and nursing note reviewed.   Constitutional:       General: He is not in acute distress.     Appearance: Normal appearance. He is well-developed. He is not ill-appearing.   HENT:      Head: Normocephalic and atraumatic.   Cardiovascular:      Rate and Rhythm: Normal rate and regular rhythm.      Heart sounds: No murmur heard.  Pulmonary:      Effort: Pulmonary effort is normal. No respiratory distress.      Breath sounds: Normal breath sounds. No wheezing, rhonchi or rales.   Skin:     General: Skin is warm and dry.      Coloration: Skin is not pale.      Findings: No erythema or rash.   Neurological:      Mental Status: He is alert and oriented to person, place, and time.      Gait: Gait normal.   Psychiatric:         Mood and Affect: Mood normal.         Behavior: Behavior normal.         Thought Content: Thought content normal.         Judgment: Judgment normal.       /80 (Site: Left Upper Arm, Position: Sitting, Cuff Size: Large Adult)   Pulse 71   Temp 97 °F (36.1 °C) (Tympanic)   Ht 1.778 m (5' 10\")   Wt 95.7 kg (211 lb)   SpO2 99%   BMI 30.28 kg/m²     Assessment:       Diagnosis Orders

## 2024-08-09 DIAGNOSIS — E78.1 HYPERTRIGLYCERIDEMIA: ICD-10-CM

## 2024-08-09 DIAGNOSIS — I10 ESSENTIAL HYPERTENSION: ICD-10-CM

## 2024-08-09 RX ORDER — FENOFIBRATE 160 MG/1
160 TABLET ORAL DAILY
Qty: 90 TABLET | Refills: 1 | Status: SHIPPED | OUTPATIENT
Start: 2024-08-09

## 2024-08-09 RX ORDER — LISINOPRIL 30 MG/1
30 TABLET ORAL DAILY
Qty: 90 TABLET | Refills: 1 | Status: SHIPPED | OUTPATIENT
Start: 2024-08-09

## 2024-10-14 RX ORDER — OMEGA-3-ACID ETHYL ESTERS 1 G/1
CAPSULE, LIQUID FILLED ORAL
Qty: 360 CAPSULE | Refills: 0 | Status: SHIPPED | OUTPATIENT
Start: 2024-10-14

## 2024-10-16 ENCOUNTER — TELEPHONE (OUTPATIENT)
Dept: FAMILY MEDICINE CLINIC | Age: 39
End: 2024-10-16

## 2024-10-16 NOTE — TELEPHONE ENCOUNTER
Patients insurance will no longer cover the 1 gm of Omega-3 Ethyl Esters   Per Sandra - patient can get an OTC fish oil and we will repeat labs in a few months.  He scheduled his 6 month ov in December and will check it at that time.

## 2024-12-24 DIAGNOSIS — F41.9 ANXIETY: ICD-10-CM

## 2024-12-24 RX ORDER — SERTRALINE HYDROCHLORIDE 100 MG/1
100 TABLET, FILM COATED ORAL DAILY
Qty: 90 TABLET | Refills: 1 | Status: SHIPPED | OUTPATIENT
Start: 2024-12-24

## 2025-02-10 ASSESSMENT — PATIENT HEALTH QUESTIONNAIRE - PHQ9
SUM OF ALL RESPONSES TO PHQ QUESTIONS 1-9: 0
2. FEELING DOWN, DEPRESSED OR HOPELESS: NOT AT ALL
SUM OF ALL RESPONSES TO PHQ9 QUESTIONS 1 & 2: 0
SUM OF ALL RESPONSES TO PHQ QUESTIONS 1-9: 0
SUM OF ALL RESPONSES TO PHQ9 QUESTIONS 1 & 2: 0
SUM OF ALL RESPONSES TO PHQ QUESTIONS 1-9: 0
1. LITTLE INTEREST OR PLEASURE IN DOING THINGS: NOT AT ALL
2. FEELING DOWN, DEPRESSED OR HOPELESS: NOT AT ALL
SUM OF ALL RESPONSES TO PHQ QUESTIONS 1-9: 0
1. LITTLE INTEREST OR PLEASURE IN DOING THINGS: NOT AT ALL

## 2025-02-12 ENCOUNTER — HOSPITAL ENCOUNTER (OUTPATIENT)
Age: 40
Setting detail: SPECIMEN
Discharge: HOME | End: 2025-02-12

## 2025-02-12 LAB
ALBUMIN SERPL-MCNC: 4.8 G/DL (ref 3.5–5.2)
ALBUMIN/GLOB SERPL: 1.7 {RATIO} (ref 1–2.5)
ALP SERPL-CCNC: 65 U/L (ref 40–129)
ALT SERPL-CCNC: 32 U/L (ref 10–50)
ANION GAP SERPL CALCULATED.3IONS-SCNC: 11 MMOL/L (ref 9–16)
AST SERPL-CCNC: 29 U/L (ref 10–50)
BASOPHILS # BLD: 0.05 K/UL (ref 0–0.2)
BASOPHILS NFR BLD: 1 % (ref 0–2)
BILIRUB SERPL-MCNC: 0.7 MG/DL (ref 0–1.2)
BUN SERPL-MCNC: 22 MG/DL (ref 6–20)
CALCIUM SERPL-MCNC: 10.3 MG/DL (ref 8.6–10.4)
CHLORIDE SERPL-SCNC: 102 MMOL/L (ref 98–107)
CHOLEST SERPL-MCNC: 233 MG/DL (ref 0–199)
CHOLESTEROL/HDL RATIO: 5.1
CO2 SERPL-SCNC: 26 MMOL/L (ref 20–31)
CREAT SERPL-MCNC: 1.4 MG/DL (ref 0.7–1.2)
EOSINOPHIL # BLD: 0.08 K/UL (ref 0–0.44)
EOSINOPHILS RELATIVE PERCENT: 1 % (ref 1–4)
ERYTHROCYTE [DISTWIDTH] IN BLOOD BY AUTOMATED COUNT: 12.1 % (ref 11.8–14.4)
GFR, ESTIMATED: 65 ML/MIN/1.73M2
GLUCOSE SERPL-MCNC: 89 MG/DL (ref 74–99)
HCT VFR BLD AUTO: 48.4 % (ref 40.7–50.3)
HDLC SERPL-MCNC: 46 MG/DL
HGB BLD-MCNC: 16.5 G/DL (ref 13–17)
IMM GRANULOCYTES # BLD AUTO: 0.03 K/UL (ref 0–0.3)
IMM GRANULOCYTES NFR BLD: 1 %
LDLC SERPL CALC-MCNC: 154 MG/DL (ref 0–100)
LYMPHOCYTES NFR BLD: 2.87 K/UL (ref 1.1–3.7)
LYMPHOCYTES RELATIVE PERCENT: 44 % (ref 24–43)
MCH RBC QN AUTO: 30.3 PG (ref 25.2–33.5)
MCHC RBC AUTO-ENTMCNC: 34.1 G/DL (ref 28.4–34.8)
MCV RBC AUTO: 88.8 FL (ref 82.6–102.9)
MONOCYTES NFR BLD: 0.49 K/UL (ref 0.1–1.2)
MONOCYTES NFR BLD: 8 % (ref 3–12)
NEUTROPHILS NFR BLD: 45 % (ref 36–65)
NEUTS SEG NFR BLD: 2.86 K/UL (ref 1.5–8.1)
NRBC BLD-RTO: 0 PER 100 WBC
PLATELET # BLD AUTO: 269 K/UL (ref 138–453)
PMV BLD AUTO: 10.5 FL (ref 8.1–13.5)
POTASSIUM SERPL-SCNC: 4.5 MMOL/L (ref 3.7–5.3)
PROT SERPL-MCNC: 7.7 G/DL (ref 6.6–8.7)
RBC # BLD AUTO: 5.45 M/UL (ref 4.21–5.77)
SODIUM SERPL-SCNC: 139 MMOL/L (ref 136–145)
TRIGL SERPL-MCNC: 163 MG/DL
VLDLC SERPL CALC-MCNC: 33 MG/DL (ref 1–30)
WBC OTHER # BLD: 6.4 K/UL (ref 3.5–11.3)

## 2025-02-13 ENCOUNTER — TELEMEDICINE (OUTPATIENT)
Dept: FAMILY MEDICINE CLINIC | Age: 40
End: 2025-02-13
Payer: COMMERCIAL

## 2025-02-13 DIAGNOSIS — E78.5 DYSLIPIDEMIA: ICD-10-CM

## 2025-02-13 DIAGNOSIS — I10 PRIMARY HYPERTENSION: Primary | ICD-10-CM

## 2025-02-13 LAB — HCV AB SERPL QL IA: NONREACTIVE

## 2025-02-13 PROCEDURE — 99213 OFFICE O/P EST LOW 20 MIN: CPT | Performed by: PHYSICIAN ASSISTANT

## 2025-02-13 RX ORDER — OMEGA-3-ACID ETHYL ESTERS 1 G/1
CAPSULE, LIQUID FILLED ORAL
Qty: 360 CAPSULE | Refills: 0 | Status: SHIPPED | OUTPATIENT
Start: 2025-02-13

## 2025-02-13 SDOH — ECONOMIC STABILITY: FOOD INSECURITY: WITHIN THE PAST 12 MONTHS, YOU WORRIED THAT YOUR FOOD WOULD RUN OUT BEFORE YOU GOT MONEY TO BUY MORE.: NEVER TRUE

## 2025-02-13 SDOH — ECONOMIC STABILITY: INCOME INSECURITY: IN THE LAST 12 MONTHS, WAS THERE A TIME WHEN YOU WERE NOT ABLE TO PAY THE MORTGAGE OR RENT ON TIME?: NO

## 2025-02-13 SDOH — ECONOMIC STABILITY: FOOD INSECURITY: WITHIN THE PAST 12 MONTHS, THE FOOD YOU BOUGHT JUST DIDN'T LAST AND YOU DIDN'T HAVE MONEY TO GET MORE.: NEVER TRUE

## 2025-02-13 SDOH — ECONOMIC STABILITY: TRANSPORTATION INSECURITY
IN THE PAST 12 MONTHS, HAS THE LACK OF TRANSPORTATION KEPT YOU FROM MEDICAL APPOINTMENTS OR FROM GETTING MEDICATIONS?: NO

## 2025-02-13 SDOH — ECONOMIC STABILITY: TRANSPORTATION INSECURITY
IN THE PAST 12 MONTHS, HAS LACK OF TRANSPORTATION KEPT YOU FROM MEETINGS, WORK, OR FROM GETTING THINGS NEEDED FOR DAILY LIVING?: NO

## 2025-02-13 ASSESSMENT — ENCOUNTER SYMPTOMS
COLOR CHANGE: 0
SHORTNESS OF BREATH: 0
ABDOMINAL PAIN: 0

## 2025-02-13 NOTE — PROGRESS NOTES
2025    TELEHEALTH EVALUATION -- Audio/Visual    HPI:    Celso Soriano (:  1985) has requested an audio/video evaluation for the following concern(s):    Patient is doing a virtual visit today for follow up on hyperlipidemia. Patient states he has been doing well but had covid in December and only just started working out regularly again.   Patient also has been off the lovaza recently. He states he still has some and will restart it but will need a new refill  He also updates he will be moving to [place] this summer    Lab Results   Component Value Date    WBC 6.4 2025    HGB 16.5 2025    HCT 48.4 2025    MCV 88.8 2025     2025     Lab Results   Component Value Date     2025    K 4.5 2025     2025    CO2 26 2025    BUN 22 (H) 2025    CREATININE 1.4 (H) 2025    GLUCOSE 89 2025    CALCIUM 10.3 2025    BILITOT 0.7 2025    ALKPHOS 65 2025    AST 29 2025    ALT 32 2025    LABGLOM 65 2025    GFRAA >60 2022       Lab Results   Component Value Date    CHOL 233 (H) 2025    TRIG 163 (H) 2025    HDL 46 2025     (H) 2025    VLDL 33 (H) 2025    CHOLHDLRATIO 5.1 2025       Review of Systems   Constitutional:  Negative for activity change, appetite change and fatigue.        There were no vitals taken for this visit.   Respiratory:  Negative for shortness of breath.    Cardiovascular:  Negative for chest pain.   Gastrointestinal:  Negative for abdominal pain.   Skin:  Negative for color change, pallor, rash and wound.   Neurological:  Negative for weakness.   Psychiatric/Behavioral:  The patient is not nervous/anxious.        Prior to Visit Medications    Medication Sig Taking? Authorizing Provider   omega-3 acid ethyl esters (LOVAZA) 1 g capsule TAKE 2 CAPSULES BY MOUTH TWICE A DAY Yes ForSandra kiser PA-C   sertraline (ZOLOFT) 100 MG

## 2025-02-13 NOTE — PROGRESS NOTES
Visit Information    Have you changed or started any medications since your last visit including any over-the-counter medicines, vitamins, or herbal medicines? no   Are you having any side effects from any of your medications? -  no  Have you stopped taking any of your medications? Is so, why? -  no    Have you seen any other physician or provider since your last visit? No  Have you had any other diagnostic tests since your last visit? No  Have you been seen in the emergency room and/or had an admission to a hospital since we last saw you? No  Have you had your routine dental cleaning in the past 6 months? no    Have you activated your [employer] account? If not, what are your barriers? Yes     Patient Care Team:  Sandra Martinez PA-C as PCP - General (Family Medicine)  Sandra Martinez PA-C as PCP - Empaneled Provider    Medical History Review  Past Medical, Family, and Social History reviewed and  contribute to the patient presenting condition    Health Maintenance   Topic Date Due    HIV screen  Never done    Hepatitis B vaccine (3 of 3 - 3-dose series) 09/29/2005    Varicella vaccine (1 of 2 - 13+ 2-dose series) Never done    Flu vaccine (1) 08/01/2024    COVID-19 Vaccine (1 - 2024-25 season) Never done    Depression Screen  02/10/2026    DTaP/Tdap/Td vaccine (4 - Td or Tdap) 10/28/2028    Lipids  02/12/2030    Hepatitis C screen  Completed    Hepatitis A vaccine  Aged Out    Hib vaccine  Aged Out    HPV vaccine  Aged Out    Polio vaccine  Aged Out    Meningococcal (ACWY) vaccine  Aged Out    Pneumococcal 0-49 years Vaccine  Aged Out

## 2025-06-22 DIAGNOSIS — F41.9 ANXIETY: ICD-10-CM

## 2025-06-22 RX ORDER — SERTRALINE HYDROCHLORIDE 100 MG/1
100 TABLET, FILM COATED ORAL DAILY
Qty: 90 TABLET | Refills: 1 | Status: SHIPPED | OUTPATIENT
Start: 2025-06-22

## 2025-08-12 DIAGNOSIS — E78.1 HYPERTRIGLYCERIDEMIA: ICD-10-CM

## 2025-08-12 DIAGNOSIS — I10 ESSENTIAL HYPERTENSION: ICD-10-CM

## 2025-08-12 RX ORDER — LISINOPRIL 30 MG/1
30 TABLET ORAL DAILY
Qty: 90 TABLET | Refills: 1 | Status: SHIPPED | OUTPATIENT
Start: 2025-08-12

## 2025-08-12 RX ORDER — FENOFIBRATE 160 MG/1
160 TABLET ORAL DAILY
Qty: 90 TABLET | Refills: 1 | Status: SHIPPED | OUTPATIENT
Start: 2025-08-12